# Patient Record
Sex: FEMALE | Race: BLACK OR AFRICAN AMERICAN | Employment: UNEMPLOYED | URBAN - METROPOLITAN AREA
[De-identification: names, ages, dates, MRNs, and addresses within clinical notes are randomized per-mention and may not be internally consistent; named-entity substitution may affect disease eponyms.]

---

## 2017-07-03 ENCOUNTER — APPOINTMENT (OUTPATIENT)
Dept: CT IMAGING | Age: 67
DRG: 078 | End: 2017-07-03
Attending: EMERGENCY MEDICINE
Payer: MEDICARE

## 2017-07-03 ENCOUNTER — APPOINTMENT (OUTPATIENT)
Dept: MRI IMAGING | Age: 67
DRG: 078 | End: 2017-07-03
Attending: INTERNAL MEDICINE
Payer: MEDICARE

## 2017-07-03 ENCOUNTER — HOSPITAL ENCOUNTER (INPATIENT)
Age: 67
LOS: 2 days | Discharge: HOME HEALTH | DRG: 078 | End: 2017-07-05
Attending: EMERGENCY MEDICINE | Admitting: INTERNAL MEDICINE
Payer: MEDICARE

## 2017-07-03 DIAGNOSIS — G45.9 TRANSIENT CEREBRAL ISCHEMIA, UNSPECIFIED TYPE: Primary | ICD-10-CM

## 2017-07-03 LAB
ANION GAP BLD CALC-SCNC: 10 MMOL/L (ref 3–18)
APTT PPP: 31.9 SEC (ref 23–36.4)
ATRIAL RATE: 79 BPM
BASOPHILS # BLD AUTO: 0.1 K/UL (ref 0–0.1)
BASOPHILS # BLD AUTO: 0.1 K/UL (ref 0–0.1)
BASOPHILS # BLD: 1 % (ref 0–2)
BASOPHILS # BLD: 1 % (ref 0–2)
BNP SERPL-MCNC: 400 PG/ML (ref 0–900)
BUN SERPL-MCNC: 12 MG/DL (ref 7–18)
BUN/CREAT SERPL: 11 (ref 12–20)
CALCIUM SERPL-MCNC: 9.1 MG/DL (ref 8.5–10.1)
CALCULATED P AXIS, ECG09: 35 DEGREES
CALCULATED R AXIS, ECG10: -5 DEGREES
CALCULATED T AXIS, ECG11: 6 DEGREES
CHLORIDE SERPL-SCNC: 105 MMOL/L (ref 100–108)
CO2 SERPL-SCNC: 25 MMOL/L (ref 21–32)
CREAT SERPL-MCNC: 1.12 MG/DL (ref 0.6–1.3)
DIAGNOSIS, 93000: NORMAL
DIFFERENTIAL METHOD BLD: ABNORMAL
DIFFERENTIAL METHOD BLD: NORMAL
EOSINOPHIL # BLD: 0.3 K/UL (ref 0–0.4)
EOSINOPHIL # BLD: 0.5 K/UL (ref 0–0.4)
EOSINOPHIL NFR BLD: 5 % (ref 0–5)
EOSINOPHIL NFR BLD: 5 % (ref 0–5)
ERYTHROCYTE [DISTWIDTH] IN BLOOD BY AUTOMATED COUNT: 13.9 % (ref 11.6–14.5)
ERYTHROCYTE [DISTWIDTH] IN BLOOD BY AUTOMATED COUNT: 13.9 % (ref 11.6–14.5)
EST. AVERAGE GLUCOSE BLD GHB EST-MCNC: 163 MG/DL
GLUCOSE BLD STRIP.AUTO-MCNC: 166 MG/DL (ref 70–110)
GLUCOSE BLD STRIP.AUTO-MCNC: 79 MG/DL (ref 70–110)
GLUCOSE BLD STRIP.AUTO-MCNC: 86 MG/DL (ref 70–110)
GLUCOSE SERPL-MCNC: 165 MG/DL (ref 74–99)
HBA1C MFR BLD: 7.3 % (ref 4.2–5.6)
HCT VFR BLD AUTO: 35.4 % (ref 35–45)
HCT VFR BLD AUTO: 38.8 % (ref 35–45)
HGB BLD-MCNC: 12.5 G/DL (ref 12–16)
HGB BLD-MCNC: 13.8 G/DL (ref 12–16)
INR PPP: 0.9 (ref 0.8–1.2)
LACTATE SERPL-SCNC: 1.7 MMOL/L (ref 0.4–2)
LYMPHOCYTES # BLD AUTO: 32 % (ref 21–52)
LYMPHOCYTES # BLD AUTO: 43 % (ref 21–52)
LYMPHOCYTES # BLD: 2.2 K/UL (ref 0.9–3.6)
LYMPHOCYTES # BLD: 4.2 K/UL (ref 0.9–3.6)
MAGNESIUM SERPL-MCNC: 1.8 MG/DL (ref 1.6–2.6)
MCH RBC QN AUTO: 27 PG (ref 24–34)
MCH RBC QN AUTO: 27.1 PG (ref 24–34)
MCHC RBC AUTO-ENTMCNC: 35.3 G/DL (ref 31–37)
MCHC RBC AUTO-ENTMCNC: 35.6 G/DL (ref 31–37)
MCV RBC AUTO: 76.2 FL (ref 74–97)
MCV RBC AUTO: 76.5 FL (ref 74–97)
MONOCYTES # BLD: 0.3 K/UL (ref 0.05–1.2)
MONOCYTES # BLD: 0.7 K/UL (ref 0.05–1.2)
MONOCYTES NFR BLD AUTO: 5 % (ref 3–10)
MONOCYTES NFR BLD AUTO: 8 % (ref 3–10)
NEUTS SEG # BLD: 4 K/UL (ref 1.8–8)
NEUTS SEG # BLD: 4.4 K/UL (ref 1.8–8)
NEUTS SEG NFR BLD AUTO: 43 % (ref 40–73)
NEUTS SEG NFR BLD AUTO: 57 % (ref 40–73)
P-R INTERVAL, ECG05: 200 MS
PLATELET # BLD AUTO: 234 K/UL (ref 135–420)
PLATELET # BLD AUTO: 239 K/UL (ref 135–420)
PMV BLD AUTO: 10.5 FL (ref 9.2–11.8)
PMV BLD AUTO: 10.7 FL (ref 9.2–11.8)
POTASSIUM SERPL-SCNC: 3.7 MMOL/L (ref 3.5–5.5)
PROTHROMBIN TIME: 12.1 SEC (ref 11.5–15.2)
Q-T INTERVAL, ECG07: 420 MS
QRS DURATION, ECG06: 78 MS
QTC CALCULATION (BEZET), ECG08: 481 MS
RBC # BLD AUTO: 4.63 M/UL (ref 4.2–5.3)
RBC # BLD AUTO: 5.09 M/UL (ref 4.2–5.3)
SODIUM SERPL-SCNC: 140 MMOL/L (ref 136–145)
VENTRICULAR RATE, ECG03: 79 BPM
WBC # BLD AUTO: 7 K/UL (ref 4.6–13.2)
WBC # BLD AUTO: 9.9 K/UL (ref 4.6–13.2)

## 2017-07-03 PROCEDURE — 70450 CT HEAD/BRAIN W/O DYE: CPT

## 2017-07-03 PROCEDURE — 83880 ASSAY OF NATRIURETIC PEPTIDE: CPT | Performed by: FAMILY MEDICINE

## 2017-07-03 PROCEDURE — 85025 COMPLETE CBC W/AUTO DIFF WBC: CPT | Performed by: EMERGENCY MEDICINE

## 2017-07-03 PROCEDURE — 65660000000 HC RM CCU STEPDOWN

## 2017-07-03 PROCEDURE — 85730 THROMBOPLASTIN TIME PARTIAL: CPT | Performed by: EMERGENCY MEDICINE

## 2017-07-03 PROCEDURE — 74011250637 HC RX REV CODE- 250/637: Performed by: EMERGENCY MEDICINE

## 2017-07-03 PROCEDURE — 74011250637 HC RX REV CODE- 250/637: Performed by: INTERNAL MEDICINE

## 2017-07-03 PROCEDURE — 93005 ELECTROCARDIOGRAM TRACING: CPT

## 2017-07-03 PROCEDURE — 85610 PROTHROMBIN TIME: CPT | Performed by: EMERGENCY MEDICINE

## 2017-07-03 PROCEDURE — 83036 HEMOGLOBIN GLYCOSYLATED A1C: CPT | Performed by: INTERNAL MEDICINE

## 2017-07-03 PROCEDURE — 99285 EMERGENCY DEPT VISIT HI MDM: CPT

## 2017-07-03 PROCEDURE — 74011000250 HC RX REV CODE- 250: Performed by: EMERGENCY MEDICINE

## 2017-07-03 PROCEDURE — 83735 ASSAY OF MAGNESIUM: CPT | Performed by: FAMILY MEDICINE

## 2017-07-03 PROCEDURE — 36415 COLL VENOUS BLD VENIPUNCTURE: CPT | Performed by: FAMILY MEDICINE

## 2017-07-03 PROCEDURE — 82962 GLUCOSE BLOOD TEST: CPT

## 2017-07-03 PROCEDURE — 83605 ASSAY OF LACTIC ACID: CPT | Performed by: FAMILY MEDICINE

## 2017-07-03 PROCEDURE — 74011250636 HC RX REV CODE- 250/636: Performed by: INTERNAL MEDICINE

## 2017-07-03 PROCEDURE — 70551 MRI BRAIN STEM W/O DYE: CPT

## 2017-07-03 PROCEDURE — 80048 BASIC METABOLIC PNL TOTAL CA: CPT | Performed by: EMERGENCY MEDICINE

## 2017-07-03 PROCEDURE — 93306 TTE W/DOPPLER COMPLETE: CPT

## 2017-07-03 RX ORDER — ATORVASTATIN CALCIUM 40 MG/1
40 TABLET, FILM COATED ORAL DAILY
COMMUNITY

## 2017-07-03 RX ORDER — GLIMEPIRIDE 2 MG/1
2 TABLET ORAL
COMMUNITY

## 2017-07-03 RX ORDER — METOPROLOL TARTRATE 25 MG/1
12.5 TABLET, FILM COATED ORAL 2 TIMES DAILY
Status: DISCONTINUED | OUTPATIENT
Start: 2017-07-03 | End: 2017-07-05 | Stop reason: HOSPADM

## 2017-07-03 RX ORDER — INSULIN LISPRO 100 [IU]/ML
INJECTION, SOLUTION INTRAVENOUS; SUBCUTANEOUS
Status: DISCONTINUED | OUTPATIENT
Start: 2017-07-03 | End: 2017-07-05 | Stop reason: HOSPADM

## 2017-07-03 RX ORDER — ACETAMINOPHEN 325 MG/1
650 TABLET ORAL
Status: DISCONTINUED | OUTPATIENT
Start: 2017-07-03 | End: 2017-07-05 | Stop reason: HOSPADM

## 2017-07-03 RX ORDER — BUSPIRONE HYDROCHLORIDE 15 MG/1
15 TABLET ORAL 2 TIMES DAILY
COMMUNITY
End: 2017-07-05

## 2017-07-03 RX ORDER — CLOPIDOGREL BISULFATE 75 MG/1
75 TABLET ORAL
COMMUNITY

## 2017-07-03 RX ORDER — SODIUM CHLORIDE 9 MG/ML
10 INJECTION INTRAMUSCULAR; INTRAVENOUS; SUBCUTANEOUS
Status: COMPLETED | OUTPATIENT
Start: 2017-07-03 | End: 2017-07-03

## 2017-07-03 RX ORDER — HYDROCODONE BITARTRATE AND ACETAMINOPHEN 5; 325 MG/1; MG/1
1 TABLET ORAL
Status: DISCONTINUED | OUTPATIENT
Start: 2017-07-03 | End: 2017-07-05 | Stop reason: HOSPADM

## 2017-07-03 RX ORDER — FAMOTIDINE 40 MG/1
40 TABLET, FILM COATED ORAL DAILY
Status: ON HOLD | COMMUNITY
End: 2017-07-05

## 2017-07-03 RX ORDER — NAPROXEN 375 MG/1
375 TABLET ORAL 2 TIMES DAILY WITH MEALS
COMMUNITY
End: 2017-07-05

## 2017-07-03 RX ORDER — METFORMIN HYDROCHLORIDE 1000 MG/1
1000 TABLET ORAL 2 TIMES DAILY WITH MEALS
COMMUNITY

## 2017-07-03 RX ORDER — LORAZEPAM 2 MG/ML
1 INJECTION INTRAMUSCULAR ONCE
Status: COMPLETED | OUTPATIENT
Start: 2017-07-03 | End: 2017-07-03

## 2017-07-03 RX ORDER — SODIUM CHLORIDE 9 MG/ML
50 INJECTION, SOLUTION INTRAVENOUS CONTINUOUS
Status: DISCONTINUED | OUTPATIENT
Start: 2017-07-03 | End: 2017-07-04

## 2017-07-03 RX ORDER — IRON POLYSACCHARIDE COMPLEX 150 MG
150 CAPSULE ORAL DAILY
COMMUNITY

## 2017-07-03 RX ORDER — MAGNESIUM SULFATE 100 %
4 CRYSTALS MISCELLANEOUS AS NEEDED
Status: DISCONTINUED | OUTPATIENT
Start: 2017-07-03 | End: 2017-07-05 | Stop reason: HOSPADM

## 2017-07-03 RX ORDER — LISINOPRIL 10 MG/1
10 TABLET ORAL DAILY
COMMUNITY

## 2017-07-03 RX ORDER — AMLODIPINE BESYLATE 10 MG/1
10 TABLET ORAL DAILY
Status: DISCONTINUED | OUTPATIENT
Start: 2017-07-04 | End: 2017-07-05 | Stop reason: HOSPADM

## 2017-07-03 RX ORDER — DEXTROSE 50 % IN WATER (D50W) INTRAVENOUS SYRINGE
25-50 AS NEEDED
Status: DISCONTINUED | OUTPATIENT
Start: 2017-07-03 | End: 2017-07-05 | Stop reason: HOSPADM

## 2017-07-03 RX ORDER — ACETAMINOPHEN 500 MG
1000 TABLET ORAL
Status: COMPLETED | OUTPATIENT
Start: 2017-07-03 | End: 2017-07-03

## 2017-07-03 RX ORDER — HYDRALAZINE HYDROCHLORIDE 20 MG/ML
10 INJECTION INTRAMUSCULAR; INTRAVENOUS
Status: DISCONTINUED | OUTPATIENT
Start: 2017-07-03 | End: 2017-07-05 | Stop reason: HOSPADM

## 2017-07-03 RX ORDER — LISINOPRIL 10 MG/1
10 TABLET ORAL DAILY
Status: DISCONTINUED | OUTPATIENT
Start: 2017-07-04 | End: 2017-07-05 | Stop reason: HOSPADM

## 2017-07-03 RX ORDER — ONDANSETRON 2 MG/ML
4 INJECTION INTRAMUSCULAR; INTRAVENOUS
Status: DISCONTINUED | OUTPATIENT
Start: 2017-07-03 | End: 2017-07-05 | Stop reason: HOSPADM

## 2017-07-03 RX ORDER — HEPARIN SODIUM 5000 [USP'U]/ML
5000 INJECTION, SOLUTION INTRAVENOUS; SUBCUTANEOUS EVERY 8 HOURS
Status: DISCONTINUED | OUTPATIENT
Start: 2017-07-03 | End: 2017-07-05 | Stop reason: HOSPADM

## 2017-07-03 RX ORDER — MONTELUKAST SODIUM 10 MG/1
10 TABLET ORAL DAILY
COMMUNITY

## 2017-07-03 RX ORDER — FLUTICASONE FUROATE AND VILANTEROL 200; 25 UG/1; UG/1
1 POWDER RESPIRATORY (INHALATION) DAILY
COMMUNITY

## 2017-07-03 RX ADMIN — METOPROLOL TARTRATE 12.5 MG: 25 TABLET ORAL at 19:15

## 2017-07-03 RX ADMIN — LORAZEPAM 1 MG: 2 INJECTION INTRAMUSCULAR; INTRAVENOUS at 21:22

## 2017-07-03 RX ADMIN — ACETAMINOPHEN 1000 MG: 500 TABLET ORAL at 14:05

## 2017-07-03 RX ADMIN — SODIUM CHLORIDE 50 ML/HR: 900 INJECTION, SOLUTION INTRAVENOUS at 19:16

## 2017-07-03 RX ADMIN — SODIUM CHLORIDE 10 ML: 9 INJECTION INTRAMUSCULAR; INTRAVENOUS; SUBCUTANEOUS at 15:43

## 2017-07-03 RX ADMIN — HYDRALAZINE HYDROCHLORIDE 10 MG: 20 INJECTION INTRAMUSCULAR; INTRAVENOUS at 22:41

## 2017-07-03 RX ADMIN — HEPARIN SODIUM 5000 UNITS: 5000 INJECTION, SOLUTION INTRAVENOUS; SUBCUTANEOUS at 23:31

## 2017-07-03 RX ADMIN — HEPARIN SODIUM 5000 UNITS: 5000 INJECTION, SOLUTION INTRAVENOUS; SUBCUTANEOUS at 14:05

## 2017-07-03 RX ADMIN — HYDROCODONE BITARTRATE AND ACETAMINOPHEN 1 TABLET: 5; 325 TABLET ORAL at 17:36

## 2017-07-03 NOTE — ED TRIAGE NOTES
Per medic: \"Around 0900 this morning patient started complaining of severe headache, left sided weakness, and slurred speech. Upon arrival to the ER patient symptoms were resolved. No slurred speech, or  Weakness however continues to complain of a headache.

## 2017-07-03 NOTE — ROUTINE PROCESS
Bedside and Verbal shift change report given to [address] [place] [address] (oncoming nurse) by Qasim Corona (offgoing nurse). Report included the following information SBAR, Kardex and MAR.

## 2017-07-03 NOTE — PROGRESS NOTES
Pt with active Continuous Bedrest order. Please discontinue and update activity order to permit participation in therapy evaluation.   Abdulaziz Martinez, PTA

## 2017-07-03 NOTE — ED NOTES
Placed pt in gown with bed in low/locked position with both side rails up and call bell within reach. Pt placed on BP, cardiac and O2 monitoring. Pt provided with warm blankets.

## 2017-07-03 NOTE — IP AVS SNAPSHOT
[address] Gaudencio Patient: Berkley Li MRN: [id number] :1950 You are allergic to the following No active allergies Recent Documentation Height Weight Breastfeeding? BMI Smoking Status 1.676 m 89.1 kg No 31.72 kg/m2 Never Smoker Emergency Contacts Name Discharge Info Relation Home Work Mobile Pollo Mccracken  Spouse [3] [phone] Naveed Mccracken  Daughter [21]   [phone] About your hospitalization You were admitted on:  July 3, 2017 You last received care in the:  [facility] [address] You were discharged on:  2017 Unit phone number:  [phone] Why you were hospitalized Your primary diagnosis was:  Not on File Your diagnoses also included:  Tia (Transient Ischemic Attack) Providers Seen During Your Hospitalizations Provider Role Specialty Primary office phone Salo Degroot MD Attending Provider Emergency Medicine [phone] Jeffery Morse MD Attending Provider Internal Medicine [phone] Your Primary Care Physician (PCP) Primary Care Physician Office Phone Office Fax NANDA ARCOS ** None ** ** None ** Follow-up Information Follow up With Details Comments Contact Info Mana Delvalle on 7/10/2017 follow up @4:00pm [address] [address] 
[phone] Current Discharge Medication List  
  
START taking these medications Dose & Instructions Dispensing Information Comments Morning Noon Evening Bedtime  
 amLODIPine 10 mg tablet Commonly known as:  Natividad Wing Your last dose was: Your next dose is:    
   
   
 Dose:  10 mg Take 1 Tab by mouth daily. Quantity:  30 Tab Refills:  0  
     
   
   
   
  
 metoprolol tartrate 25 mg tablet Commonly known as:  LOPRESSOR  
 Your last dose was: Your next dose is:    
   
   
 Dose:  12.5 mg Take 0.5 Tabs by mouth two (2) times a day. Quantity:  30 Tab Refills:  0  
     
   
   
   
  
 OTHER Your last dose was: Your next dose is:    
   
   
 Check CBC, CMP, Mg in 3 days, results to PCP immediately. Diagnosis- HTN Quantity:  1 Each Refills:  0 CONTINUE these medications which have CHANGED Dose & Instructions Dispensing Information Comments Morning Noon Evening Bedtime  
 famotidine 20 mg tablet Commonly known as:  PEPCID What changed:   
- medication strength 
- how much to take Your last dose was: Your next dose is:    
   
   
 Dose:  20 mg Take 1 Tab by mouth daily. Quantity:  30 Tab Refills:  0 CONTINUE these medications which have NOT CHANGED Dose & Instructions Dispensing Information Comments Morning Noon Evening Bedtime  
 atorvastatin 40 mg tablet Commonly known as:  LIPITOR Your last dose was: Your next dose is:    
   
   
 Dose:  40 mg Take 40 mg by mouth daily. Refills:  0  
     
   
   
   
  
 BREO ELLIPTA 200-25 mcg/dose inhaler Generic drug:  fluticasone-vilanterol Your last dose was: Your next dose is:    
   
   
 Dose:  1 Puff Take 1 Puff by inhalation daily. Refills:  0  
     
   
   
   
  
 clopidogrel 75 mg Tab Commonly known as:  PLAVIX Your last dose was: Your next dose is:    
   
   
 Dose:  75 mg Take 75 mg by mouth. Refills:  0  
     
   
   
   
  
 fluticasone 27.5 mcg/actuation nasal spray Commonly known as:  VERAMYST Your last dose was: Your next dose is:    
   
   
 Dose:  2 Spray 2 Sprays by Nasal route daily. Refills:  0  
     
   
   
   
  
 glimepiride 2 mg tablet Commonly known as:  AMARYL Your last dose was: Your next dose is:    
   
   
 Dose:  2 mg Take 2 mg by mouth every morning. Refills:  0  
     
   
   
   
  
 iron polysaccharides 150 mg iron capsule Commonly known as:  Surjit Garduno Your last dose was: Your next dose is:    
   
   
 Dose:  150 mg Take 150 mg by mouth daily. Refills:  0  
     
   
   
   
  
 lisinopril 10 mg tablet Commonly known as:  Mccracken Tyler Your last dose was: Your next dose is:    
   
   
 Dose:  10 mg Take 10 mg by mouth daily. Refills:  0  
     
   
   
   
  
 metFORMIN 1,000 mg tablet Commonly known as:  GLUCOPHAGE Your last dose was: Your next dose is:    
   
   
 Dose:  1000 mg Take 1,000 mg by mouth two (2) times daily (with meals). Refills:  0  
     
   
   
   
  
 montelukast 10 mg tablet Commonly known as:  SINGULAIR Your last dose was: Your next dose is:    
   
   
 Dose:  10 mg Take 10 mg by mouth daily. Refills:  0 STOP taking these medications   
 busPIRone 15 mg tablet Commonly known as:  BUSPAR  
   
  
 naproxen 375 mg tablet Commonly known as:  NAPROSYN Where to Get Your Medications Information on where to get these meds will be given to you by the nurse or doctor. ! Ask your nurse or doctor about these medications  
  amLODIPine 10 mg tablet  
 famotidine 20 mg tablet  
 metoprolol tartrate 25 mg tablet OTHER Discharge Instructions Transient Ischemic Attack: Care Instructions Your Care Instructions A transient ischemic attack (TIA) is when blood flow to a part of your brain is blocked for a short time. A TIA is like a stroke but usually lasts only a few minutes. A TIA does not cause lasting brain damage. Any vision problems, slurred speech, or other symptoms usually go away in 10 to 20 minutes. But they may last for up to 24 hours. TIAs are often warning signs of a stroke.  Some people who have a TIA may have a stroke in the future. A stroke can cause symptoms like those of a TIA. But a stroke causes lasting damage to your brain. You can take steps to help prevent a stroke. One thing you can do is get early treatment. If you have other new symptoms, or if your symptoms do not get better, go back to the emergency room or call your doctor right away. Getting treatment right away may prevent long-term brain damage caused by a stroke. The doctor has checked you carefully, but problems can develop later. If you notice any problems or new symptoms, get medical treatment right away. Follow-up care is a key part of your treatment and safety. Be sure to make and go to all appointments, and call your doctor if you are having problems. It's also a good idea to know your test results and keep a list of the medicines you take. How can you care for yourself at home? Medicines · Be safe with medicines. Take your medicines exactly as prescribed. Call your doctor if you think you are having a problem with your medicine. · If you take a blood thinner, such as aspirin, be sure you get instructions about how to take your medicine safely. Blood thinners can cause serious bleeding problems. · Call your doctor if you are not able to take your medicines for any reason. · Do not take any over-the-counter medicines or herbal products without talking to your doctor first. 
· If you take birth control pills or hormone therapy, talk to your doctor. Ask if these treatments are right for you. Lifestyle changes · Do not smoke. If you need help quitting, talk to your doctor about stop-smoking programs and medicines. · Be active. If your doctor recommends it, get more exercise. Walking is a good choice. Bit by bit, increase the amount you walk every day. Try for at least 30 minutes on most days of the week. You also may want to swim, bike, or do other activities. · Eat heart-healthy foods.  These include fruits, vegetables, high-fiber foods, fish, and foods that are low in sodium, saturated fat, and trans fat. · Stay at a healthy weight. Lose weight if you need to. · Limit alcohol to 2 drinks a day for men and 1 drink a day for women. Staying healthy · Manage other health problems such as diabetes, high blood pressure, and high cholesterol. · Get the flu vaccine every year. When should you call for help? Call 911 anytime you think you may need emergency care. For example, call if: 
· You have new or worse symptoms of a stroke. These may include: 
¨ Sudden numbness, tingling, weakness, or loss of movement in your face, arm, or leg, especially on only one side of your body. ¨ Sudden vision changes. ¨ Sudden trouble speaking. ¨ Sudden confusion or trouble understanding simple statements. ¨ Sudden problems with walking or balance. ¨ A sudden, severe headache that is different from past headaches. Call 911 even if these symptoms go away in a few minutes. · You feel like you are having another TIA. Watch closely for changes in your health, and be sure to contact your doctor if you have any problems. Where can you learn more? Go to http://[facility].info/. Enter [phone] in the search box to learn more about \"Transient Ischemic Attack: Care Instructions. \" Current as of: March 20, 2017 Content Version: 11.3 © 4777-1963 [employer]. Care instructions adapted under license by [employer] (which disclaims liability or warranty for this information). If you have questions about a medical condition or this instruction, always ask your healthcare professional. [place] [address] any warranty or liability for your use of this information. DISCHARGE SUMMARY from Nurse The following personal items are in your possession at time of discharge: 
 
Dental Appliances: None Visual Aid: Glasses, At bedside Home Medications: Sent home Jewelry: None Clothing: Undergarments, Pants, Slippers Other Valuables: None PATIENT INSTRUCTIONS: 
 
 
F-face looks uneven A-arms unable to move or move unevenly S-speech slurred or non-existent T-time-call 911 as soon as signs and symptoms begin-DO NOT go Back to bed or wait to see if you get better-TIME IS BRAIN. Warning Signs of HEART ATTACK Call 911 if you have these symptoms: 
? Chest discomfort. Most heart attacks involve discomfort in the center of the chest that lasts more than a few minutes, or that goes away and comes back. It can feel like uncomfortable pressure, squeezing, fullness, or pain. ? Discomfort in other areas of the upper body. Symptoms can include pain or discomfort in one or both arms, the back, neck, jaw, or stomach. ? Shortness of breath with or without chest discomfort. ? Other signs may include breaking out in a cold sweat, nausea, or lightheadedness. Don't wait more than five minutes to call [address]! Fast action can save your life. Calling 911 is almost always the fastest way to get lifesaving treatment. Emergency Medical Services staff can begin treatment when they arrive  up to an hour sooner than if someone gets to the hospital by car. The discharge information has been reviewed with the patient and spouse. The patient and spouse verbalized understanding. Discharge medications reviewed with the patient and spouse and appropriate educational materials and side effects teaching were provided. Discharge Orders None Introducing [facility]! Yudi Garcia introduces [employer] patient portal. Now you can access parts of your medical record, email your doctor's office, and request medication refills online. 1. In your internet browser, go to [email or web address] 2. Click on the First Time User? Click Here link in the Sign In box. You will see the New Member Sign Up page. 3. Enter your [employer] Access Code exactly as it appears below. You will not need to use this code after youve completed the sign-up process. If you do not sign up before the expiration date, you must request a new code. · [employer] Access Code: [email or web address] Expires: 10/3/2017  2:30 PM 
 
4. Enter the last four digits of your Social Security Number (xxxx) and Date of Birth (mm/dd/yyyy) as indicated and click Submit. You will be taken to the next sign-up page. 5. Create a [employer]maker ID. This will be your [employer]maker login ID and cannot be changed, so think of one that is secure and easy to remember. 6. Create a [employer]maker password. You can change your password at any time. 7. Enter your Password Reset Question and Answer. This can be used at a later time if you forget your password. 8. Enter your e-mail address. You will receive e-mail notification when new information is available in [address] 9. Click Sign Up. You can now view and download portions of your medical record. 10. Click the Download Summary menu link to download a portable copy of your medical information. If you have questions, please visit the Frequently Asked Questions section of the [employer] website. Remember, [employer] is NOT to be used for urgent needs. For medical emergencies, dial 911. Now available from your iPhone and Android! General Information Please provide this summary of care documentation to your next provider. Patient Signature:  ____________________________________________________________ Date:  ____________________________________________________________  
  
Elizabethann Glad Provider Signature:  ____________________________________________________________ Date:  ____________________________________________________________

## 2017-07-03 NOTE — PROGRESS NOTES
Completed Echocardiogram. Report to follow. Patient to be transported back to room.     SHANITA Bonilla, RDCS

## 2017-07-03 NOTE — IP AVS SNAPSHOT
Current Discharge Medication List  
  
START taking these medications Dose & Instructions Dispensing Information Comments Morning Noon Evening Bedtime  
 amLODIPine 10 mg tablet Commonly known as:  Alex Egan Your last dose was: Your next dose is:    
   
   
 Dose:  10 mg Take 1 Tab by mouth daily. Quantity:  30 Tab Refills:  0  
     
   
   
   
  
 metoprolol tartrate 25 mg tablet Commonly known as:  LOPRESSOR Your last dose was: Your next dose is:    
   
   
 Dose:  12.5 mg Take 0.5 Tabs by mouth two (2) times a day. Quantity:  30 Tab Refills:  0  
     
   
   
   
  
 OTHER Your last dose was: Your next dose is:    
   
   
 Check CBC, CMP, Mg in 3 days, results to PCP immediately. Diagnosis- HTN Quantity:  1 Each Refills:  0 CONTINUE these medications which have CHANGED Dose & Instructions Dispensing Information Comments Morning Noon Evening Bedtime  
 famotidine 20 mg tablet Commonly known as:  PEPCID What changed:   
- medication strength 
- how much to take Your last dose was: Your next dose is:    
   
   
 Dose:  20 mg Take 1 Tab by mouth daily. Quantity:  30 Tab Refills:  0 CONTINUE these medications which have NOT CHANGED Dose & Instructions Dispensing Information Comments Morning Noon Evening Bedtime  
 atorvastatin 40 mg tablet Commonly known as:  LIPITOR Your last dose was: Your next dose is:    
   
   
 Dose:  40 mg Take 40 mg by mouth daily. Refills:  0  
     
   
   
   
  
 BREO ELLIPTA 200-25 mcg/dose inhaler Generic drug:  fluticasone-vilanterol Your last dose was: Your next dose is:    
   
   
 Dose:  1 Puff Take 1 Puff by inhalation daily. Refills:  0  
     
   
   
   
  
 clopidogrel 75 mg Tab Commonly known as:  PLAVIX Your last dose was: Your next dose is:    
   
   
 Dose:  75 mg Take 75 mg by mouth. Refills:  0  
     
   
   
   
  
 fluticasone 27.5 mcg/actuation nasal spray Commonly known as:  VERAMYST Your last dose was: Your next dose is:    
   
   
 Dose:  2 Spray 2 Sprays by Nasal route daily. Refills:  0  
     
   
   
   
  
 glimepiride 2 mg tablet Commonly known as:  AMARYL Your last dose was: Your next dose is:    
   
   
 Dose:  2 mg Take 2 mg by mouth every morning. Refills:  0  
     
   
   
   
  
 iron polysaccharides 150 mg iron capsule Commonly known as:  Iliana Starcher Your last dose was: Your next dose is:    
   
   
 Dose:  150 mg Take 150 mg by mouth daily. Refills:  0  
     
   
   
   
  
 lisinopril 10 mg tablet Commonly known as:  Taya Tiger Your last dose was: Your next dose is:    
   
   
 Dose:  10 mg Take 10 mg by mouth daily. Refills:  0  
     
   
   
   
  
 metFORMIN 1,000 mg tablet Commonly known as:  GLUCOPHAGE Your last dose was: Your next dose is:    
   
   
 Dose:  1000 mg Take 1,000 mg by mouth two (2) times daily (with meals). Refills:  0  
     
   
   
   
  
 montelukast 10 mg tablet Commonly known as:  SINGULAIR Your last dose was: Your next dose is:    
   
   
 Dose:  10 mg Take 10 mg by mouth daily. Refills:  0 STOP taking these medications   
 busPIRone 15 mg tablet Commonly known as:  BUSPAR  
   
  
 naproxen 375 mg tablet Commonly known as:  NAPROSYN Where to Get Your Medications Information on where to get these meds will be given to you by the nurse or doctor. ! Ask your nurse or doctor about these medications  
  amLODIPine 10 mg tablet  
 famotidine 20 mg tablet  
 metoprolol tartrate 25 mg tablet  OTHER

## 2017-07-03 NOTE — ED PROVIDER NOTES
HPI Comments: Patient brought in by EMS with left-sided facial droop, started at 9 PM today, completely resolved EMS reports there NIH of 0, they do not appreciate any focal neurologic deficit on their evaluation. She was at her normal state of health prior to evaluation, no chest pain or abdominal pain. No history of stroke. No other complaints. Patient is a 78 y.o. female presenting with headaches and weakness. Headache   Associated symptoms include headaches. Pertinent negatives include no chest pain and no shortness of breath. Extremity Weakness   Associated symptoms include headaches. Pertinent negatives include no shortness of breath and no chest pain. Past Medical History:   Diagnosis Date    Diabetes ([facility] [address]     Hypertension     Stroke [facility])        History reviewed. No pertinent surgical history. History reviewed. No pertinent family history. Social History     Social History    Marital status:      Spouse name: N/A    Number of children: N/A    Years of education: N/A     Occupational History    Not on file. Social History Main Topics    Smoking status: Never Smoker    Smokeless tobacco: Never Used    Alcohol use No    Drug use: No    Sexual activity: Not on file     Other Topics Concern    Not on file     Social History Narrative    No narrative on file         ALLERGIES: Review of patient's allergies indicates no known allergies. Review of Systems   Constitutional: Negative for fever. HENT: Negative for nosebleeds. Eyes: Negative for visual disturbance. Respiratory: Negative for shortness of breath. Cardiovascular: Negative for chest pain. Gastrointestinal: Negative for blood in stool. Genitourinary: Negative for dysuria. Musculoskeletal: Negative for myalgias. Skin: Negative for rash. Neurological: Positive for weakness and headaches. All other systems reviewed and are negative.       Vitals:    07/03/17 1136   BP: (!) 194/104   Pulse: 93   Resp: 16   Temp: 98 °F (36.7 °C)   SpO2: 99%   Weight: 90.7 kg (200 lb)            Physical Exam   Constitutional:   General:  Well-developed, well-nourished, no apparent distress. Head:  Normocephalic atraumatic. Eyes:  Pupils midrange extraocular movements intact. No pallor or conjunctival injection. Nose:  No rhinorrhea, inspection grossly normal.    Ears:  Grossly normal to inspection, no discharge. Mouth:  Mucous membranes moist, no appreciable intraoral lesion. Neck/Back:  Trachea midline, no asymmetry. Chest:  Grossly normal inspection, symmetric chest rise. Pulmonary:  Clear to auscultation bilaterally no wheezes rhonchi or rales. Cardiovascular:  S1-S2 no murmurs rubs or gallops. Abdomen: Soft, nontender, nondistended no guarding rebound or peritoneal signs. Extremities:  Grossly normal to inspection, peripheral pulses intact    Neurologic:  Alert and oriented  Able to answer name and date correctly  Performs eye closing and hand squeeze tasks  No dysarthria or aphasia  Symmetric smile, uvula midline, no blunting of nasolabial fold  Facial sensation grossly intact to light touch  UPPER extremity:  Bilateral arm raised off bed and held against gravity for 5 seconds   strong and equal  No past pointing  Sensation grossly intact to light touch  LOWER extremity:  Bilateral leg raises off bed and held against gravity for 5 seconds  Sensation grossly intact to light touch. Skin:  Warm and dry  Psychiatric:  Grossly normal mood and affect. Nursing note reviewed, vital signs reviewed. MDM  Number of Diagnoses or Management Options  Transient cerebral ischemia, unspecified type:   Diagnosis management comments: ED course:  Patient presents with LEFT sided facial droop, no appreciable deficit on my evaluation NIH equals 0, will still consult tele-neurology sent directly to CT scan for risk stratification      Consult:  Discussed care with Dr. Francis Allen.  Standard discussion; including history of patient's chief complaint, available diagnostic results, and treatment course. CT head per radiology microvascular changes     Patient will be admitted for further management labs are unremarkable        Patient's presentation, history, physical exam and laboratory evaluations were reviewed. I felt the patient would benefit from inpatient management and treatment. Consult:  Discussed care with Dr Pranav Eagle . Carla Biggs Standard discussion; including history of patient's chief complaint, available diagnostic results, and treatment course. Patient was accepted to their service. Disposition:     Admitted to medicine service        Portions of this chart were created with Dragon medical speech to text program.   Unrecognized errors may be present.       ED Course       Procedures

## 2017-07-03 NOTE — Clinical Note
Status[de-identified] Inpatient [101] Type of Bed: Telemetry [19] Inpatient Hospitalization Certified Necessary for the Following Reasons: 9. Other (further clarification in H&P documentation) Admitting Diagnosis: TIA (transient ischemic attack) [593463] Admitting Physician: Bunny Abrams Attending Physician: Bunny Abrams Estimated Length of Stay: 2 Midnights Discharge Plan[de-identified] Home with Office Follow-up

## 2017-07-03 NOTE — ROUTINE PROCESS
TRANSFER - IN REPORT:    Verbal report received from Leonardo WHIPPLE RN(name) on Baby Poke  being received from ED(unit) for routine progression of care      Report consisted of patients Situation, Background, Assessment and   Recommendations(SBAR). Information from the following report(s) SBAR, Kardex, ED Summary and Recent Results was reviewed with the receiving nurse. Opportunity for questions and clarification was provided. Assessment completed upon patients arrival to unit and care assumed.

## 2017-07-04 LAB
ANION GAP BLD CALC-SCNC: 9 MMOL/L (ref 3–18)
ATRIAL RATE: 69 BPM
BASOPHILS # BLD AUTO: 0 K/UL (ref 0–0.1)
BASOPHILS # BLD: 0 % (ref 0–2)
BUN SERPL-MCNC: 11 MG/DL (ref 7–18)
BUN/CREAT SERPL: 11 (ref 12–20)
CALCIUM SERPL-MCNC: 8.7 MG/DL (ref 8.5–10.1)
CALCULATED P AXIS, ECG09: 57 DEGREES
CALCULATED R AXIS, ECG10: 9 DEGREES
CALCULATED T AXIS, ECG11: 13 DEGREES
CHLORIDE SERPL-SCNC: 105 MMOL/L (ref 100–108)
CHOLEST SERPL-MCNC: 106 MG/DL
CO2 SERPL-SCNC: 27 MMOL/L (ref 21–32)
CREAT SERPL-MCNC: 0.96 MG/DL (ref 0.6–1.3)
DIAGNOSIS, 93000: NORMAL
DIFFERENTIAL METHOD BLD: ABNORMAL
EOSINOPHIL # BLD: 0.5 K/UL (ref 0–0.4)
EOSINOPHIL NFR BLD: 6 % (ref 0–5)
ERYTHROCYTE [DISTWIDTH] IN BLOOD BY AUTOMATED COUNT: 14 % (ref 11.6–14.5)
GLUCOSE BLD STRIP.AUTO-MCNC: 115 MG/DL (ref 70–110)
GLUCOSE BLD STRIP.AUTO-MCNC: 123 MG/DL (ref 70–110)
GLUCOSE BLD STRIP.AUTO-MCNC: 127 MG/DL (ref 70–110)
GLUCOSE BLD STRIP.AUTO-MCNC: 99 MG/DL (ref 70–110)
GLUCOSE SERPL-MCNC: 94 MG/DL (ref 74–99)
HBA1C MFR BLD: 7.6 % (ref 4.2–5.6)
HCT VFR BLD AUTO: 35.6 % (ref 35–45)
HDLC SERPL-MCNC: 48 MG/DL (ref 40–60)
HDLC SERPL: 2.2 {RATIO} (ref 0–5)
HGB BLD-MCNC: 12.6 G/DL (ref 12–16)
LDLC SERPL CALC-MCNC: 39.6 MG/DL (ref 0–100)
LIPID PROFILE,FLP: NORMAL
LYMPHOCYTES # BLD AUTO: 45 % (ref 21–52)
LYMPHOCYTES # BLD: 3.5 K/UL (ref 0.9–3.6)
MCH RBC QN AUTO: 27 PG (ref 24–34)
MCHC RBC AUTO-ENTMCNC: 35.4 G/DL (ref 31–37)
MCV RBC AUTO: 76.2 FL (ref 74–97)
MONOCYTES # BLD: 0.6 K/UL (ref 0.05–1.2)
MONOCYTES NFR BLD AUTO: 8 % (ref 3–10)
NEUTS SEG # BLD: 3.1 K/UL (ref 1.8–8)
NEUTS SEG NFR BLD AUTO: 41 % (ref 40–73)
P-R INTERVAL, ECG05: 218 MS
PLATELET # BLD AUTO: 238 K/UL (ref 135–420)
PMV BLD AUTO: 10.8 FL (ref 9.2–11.8)
POTASSIUM SERPL-SCNC: 3.7 MMOL/L (ref 3.5–5.5)
Q-T INTERVAL, ECG07: 452 MS
QRS DURATION, ECG06: 86 MS
QTC CALCULATION (BEZET), ECG08: 484 MS
RBC # BLD AUTO: 4.67 M/UL (ref 4.2–5.3)
SODIUM SERPL-SCNC: 141 MMOL/L (ref 136–145)
TRIGL SERPL-MCNC: 92 MG/DL (ref ?–150)
VENTRICULAR RATE, ECG03: 69 BPM
VLDLC SERPL CALC-MCNC: 18.4 MG/DL
WBC # BLD AUTO: 7.7 K/UL (ref 4.6–13.2)

## 2017-07-04 PROCEDURE — 97161 PT EVAL LOW COMPLEX 20 MIN: CPT

## 2017-07-04 PROCEDURE — 74011636637 HC RX REV CODE- 636/637: Performed by: EMERGENCY MEDICINE

## 2017-07-04 PROCEDURE — 74011000250 HC RX REV CODE- 250

## 2017-07-04 PROCEDURE — 82962 GLUCOSE BLOOD TEST: CPT

## 2017-07-04 PROCEDURE — 65660000000 HC RM CCU STEPDOWN

## 2017-07-04 PROCEDURE — 74011250636 HC RX REV CODE- 250/636: Performed by: INTERNAL MEDICINE

## 2017-07-04 PROCEDURE — 97165 OT EVAL LOW COMPLEX 30 MIN: CPT

## 2017-07-04 PROCEDURE — 74011250637 HC RX REV CODE- 250/637: Performed by: EMERGENCY MEDICINE

## 2017-07-04 PROCEDURE — 80061 LIPID PANEL: CPT | Performed by: INTERNAL MEDICINE

## 2017-07-04 PROCEDURE — 85025 COMPLETE CBC W/AUTO DIFF WBC: CPT | Performed by: INTERNAL MEDICINE

## 2017-07-04 PROCEDURE — 77030027138 HC INCENT SPIROMETER -A

## 2017-07-04 PROCEDURE — 83036 HEMOGLOBIN GLYCOSYLATED A1C: CPT | Performed by: PSYCHIATRY & NEUROLOGY

## 2017-07-04 PROCEDURE — 36415 COLL VENOUS BLD VENIPUNCTURE: CPT | Performed by: INTERNAL MEDICINE

## 2017-07-04 PROCEDURE — 74011250637 HC RX REV CODE- 250/637: Performed by: INTERNAL MEDICINE

## 2017-07-04 PROCEDURE — 80048 BASIC METABOLIC PNL TOTAL CA: CPT | Performed by: INTERNAL MEDICINE

## 2017-07-04 RX ORDER — LABETALOL HCL 20 MG/4 ML
10 SYRINGE (ML) INTRAVENOUS
Status: DISCONTINUED | OUTPATIENT
Start: 2017-07-04 | End: 2017-07-05 | Stop reason: HOSPADM

## 2017-07-04 RX ORDER — LABETALOL HYDROCHLORIDE 5 MG/ML
INJECTION, SOLUTION INTRAVENOUS
Status: COMPLETED
Start: 2017-07-04 | End: 2017-07-04

## 2017-07-04 RX ORDER — CLOPIDOGREL BISULFATE 75 MG/1
75 TABLET ORAL DAILY
Status: DISCONTINUED | OUTPATIENT
Start: 2017-07-04 | End: 2017-07-05 | Stop reason: HOSPADM

## 2017-07-04 RX ORDER — ATORVASTATIN CALCIUM 40 MG/1
40 TABLET, FILM COATED ORAL
Status: DISCONTINUED | OUTPATIENT
Start: 2017-07-04 | End: 2017-07-05 | Stop reason: HOSPADM

## 2017-07-04 RX ADMIN — ATORVASTATIN CALCIUM 40 MG: 40 TABLET, FILM COATED ORAL at 22:01

## 2017-07-04 RX ADMIN — METOPROLOL TARTRATE 12.5 MG: 25 TABLET ORAL at 18:16

## 2017-07-04 RX ADMIN — HEPARIN SODIUM 5000 UNITS: 5000 INJECTION, SOLUTION INTRAVENOUS; SUBCUTANEOUS at 13:06

## 2017-07-04 RX ADMIN — INSULIN DETEMIR 12 UNITS: 100 INJECTION, SOLUTION SUBCUTANEOUS at 18:25

## 2017-07-04 RX ADMIN — Medication 10 MG: at 18:14

## 2017-07-04 RX ADMIN — HEPARIN SODIUM 5000 UNITS: 5000 INJECTION, SOLUTION INTRAVENOUS; SUBCUTANEOUS at 06:00

## 2017-07-04 RX ADMIN — LABETALOL HYDROCHLORIDE 10 MG: 5 INJECTION, SOLUTION INTRAVENOUS at 18:14

## 2017-07-04 RX ADMIN — CLOPIDOGREL BISULFATE 75 MG: 75 TABLET ORAL at 14:48

## 2017-07-04 RX ADMIN — ACETAMINOPHEN 650 MG: 325 TABLET ORAL at 21:55

## 2017-07-04 RX ADMIN — HYDROCODONE BITARTRATE AND ACETAMINOPHEN 1 TABLET: 5; 325 TABLET ORAL at 16:17

## 2017-07-04 RX ADMIN — LISINOPRIL 10 MG: 10 TABLET ORAL at 08:34

## 2017-07-04 RX ADMIN — HEPARIN SODIUM 5000 UNITS: 5000 INJECTION, SOLUTION INTRAVENOUS; SUBCUTANEOUS at 21:55

## 2017-07-04 RX ADMIN — AMLODIPINE BESYLATE 10 MG: 10 TABLET ORAL at 08:34

## 2017-07-04 RX ADMIN — METOPROLOL TARTRATE 12.5 MG: 25 TABLET ORAL at 08:35

## 2017-07-04 NOTE — CONSULTS
Full note dictated  Call for questions  TIA vs hypertensive urgency/crisis  Endorses 2 weeks htn,headache and chest pain  On maximal med therapy  Need to bring down BP. Stop elavil. [place] Dials Teo Dials May be contributing.   Also has prolonged QT

## 2017-07-04 NOTE — CONSULTS
[address]    Name:  Rylee Eubanks  MR#:  [id number]  :  1950  Account #:  [de-identified]  Date of Adm:  2017  Date of Consultation:  2017      REASON FOR CONSULTATION REQUEST: TIA. HISTORY OF PRESENT ILLNESS: This pleasant 60-year-old woman  with a history of type 2 diabetes; hypertension; positive family history of  stroke, multiple at a young age with her mother; dyslipidemia, on statin  therapy, and  ancestry. Presents with the sudden  onset of left-sided facial droop and left upper extremity weakness that  was stuttering, transient for 3 hours in duration or so and resolved  spontaneously. The patient was evaluated for intravenous tPA but as  the symptomatology had resolved, she was felt to be not a candidate. Her blood pressure documented on presentation was 194/104 at least  when Teleneurology evaluated her. Other symptoms include headache,  which has been ongoing for 2-3 weeks, intermittent chest pain, which  has been going on for 2-3 weeks, and this seems to coincide shortly  after initiation of Elavil that she takes for insomnia at night. She is  currently still prescribed Elavil. The patient had at least 1 more episode  of neurologic symptoms with a blood pressure of 181/93. The patient  had altered mental status. Please see Dr. Estephania Gaytan note, the first year  medical resident, on 2017 at 11:26 p.m. The patient's highest  blood pressure during the hospitalization was 001 systolic and 434  diastolic. There was concern that she may have been toxic narcotics  analgesics and Ativan for that confusional episode. The patient is now  back to her baseline with some headache. Current blood pressure  195/87. SOCIAL HISTORY: She has never smoked. She does have reactive  airway disease for which she takes inhalers for. She does not use illicit  substances.     REVIEW OF SYSTEMS: She denies any vision difficulties or other  complaints. Her 10-system review of systems otherwise as outlined  above. ALLERGIES: NO DRUG ALLERGIES. PHYSICAL EXAMINATION  GENERAL: She is awake. She is alert. NEUROLOGIC: With the history of strokes, she reports her stroke  caused right-sided weakness. All I see is perhaps a subtle facial droop  on the right. The remainder of her cranial nerves are intact. Pupils  equal, round and reactive. Hypertensive vascular changes on direct  funduscopic evaluation. Otherwise, no papilledema. Finger tapping is  clumsy bilaterally. Coordination is fine. There was no pronator drift. I  was able to get the patient up in the room to walk with touch assist.  She did not have truncal ataxia. There is no cortical sensory loss. DIAGNOSTIC STUDIES: EKG: Please note has the QT corrected to  484 which is prolonged. It was sinus. Echocardiogram with bubble study was normal.    MRI of brain only notable for some mild chronic microvascular  ischemic changes in the white matter. There was incidental note of a  small left mastoid effusion seen on MRI. This is typically over-called. I  reviewed this history and there was no clear history of sinus disease or  posterior ear pain sites type symptoms. ASSESSMENT: Patient with transient ischemic attack. Question if this  was in fact hypertensive encephalopathy. No need for permissive  hypertension. Please treat blood pressure to JNC Standard. She is  already on Plavix. The admission H and P suggests in the outpatient  setting she is on aspirin and Plavix. One could consider this for 90  days for stroke prevention. The long-term combination of Plavix and  aspirin is still unacceptable given the results of the MATCH trial. At this  point, the combination of aspirin and Plavix may be tricky given this  terrible blood pressure she is having. Now she started Elavil recently.  I  think the Elavil needs to be stopped because of her prolonged QT  interval but furthermore, it may be contributing to her blood pressure  issue. I would recommend discontinuing it and have already done so. Hemoglobin A1c to assess how her diabetes control has been doing. I  would recommend treating it to a level of 7 for long-term stroke  prevention. There is no assessment of her anterior circulation of the  carotids. Consider the role of a CTA; however, her creatinine is mildly  high. At this point, we will get a carotid duplex and if more defined or  better quality imaging is needed, the CTA could be considered in the  future. No further recommendations. KARLA Coronel MD    WT / Tyree Santo  D:  07/04/2017   17:16  T:  07/04/2017   17:54  Job #:  [id number]

## 2017-07-04 NOTE — PROGRESS NOTES
Problem: Self Care Deficits Care Plan (Adult)  Goal: *Acute Goals and Plan of Care (Insert Text)  Outcome: Resolved/Met Date Met:  07/04/17  OCCUPATIONAL THERAPY EVALUATION/DISCHARGE     Patient: Tahmina Roque (82 y.o. female)  Date: 7/4/2017  Primary Diagnosis: TIA (transient ischemic attack)  TIA (transient ischemic attack)        Precautions:   Fall      ASSESSMENT AND RECOMMENDATIONS:  Based on the objective data described below, the patient needed SBA during bed mobility and CGA/SBA with HHA during simulated toilet transfer/fucntional mobility tasks. Patient has WFL BUE AROM and strength. Patient was able to simulate LE self care tasks with SBA/CGA sitting on EOB. Educated patient on recommendation on use of her shower chair for safety during bathing tasks and having her  with her for safety. Patient deferred to PT for mobility. Skilled acute care occupational therapy is not indicated at this time. Discharge Recommendations: Home Health with 24/7 supervision for safety   Further Equipment Recommendations for Discharge: N/A       COMPLEXITY      Eval Complexity: History: LOW Complexity : Brief history review ; Examination: LOW Complexity : 1-3 performance deficits relating to physical, cognitive , or psychosocial skils that result in activity limitations and / or participation restrictions ; Decision Making:LOW Complexity : No comorbidities that affect functional and no verbal or physical assistance needed to complete eval tasks  Assessment: Low Complexity          G-CODES:      Self Care  Current  CI= 1-19%   Goal  CI= 1-19%   D/C  CI= 1-19%. The severity rating is based on the Level of Assistance required for Functional Mobility and ADLs. SUBJECTIVE:   Patient stated My  is there with me all the time.       OBJECTIVE DATA SUMMARY:       Past Medical History:   Diagnosis Date    Diabetes ([address]      Hypertension      Stroke [facility])     History reviewed.  No pertinent surgical history. Prior Level of Function/Home Situation: Patient reported she was independent in basic self care tasks and functional mobility PTA. Home Situation  Home Environment: Private residence  # Steps to Enter: 6  One/Two Story Residence: One story  Living Alone: No  Support Systems: Spouse/Significant Other/Partner  Patient Expects to be Discharged to[de-identified] Private residence  Current DME Used/Available at Home: [address], [place], straight, Shower chair, Raised toilet seat, Walker, rolling  Tub or Shower Type: Tub/Shower combination  [X]     Right hand dominant       [ ]     Left hand dominant  Cognitive/Behavioral Status:  Neurologic State: Alert  Orientation Level: Oriented X4  Cognition: Follows commands     Skin: No skin changes noted     Edema: No edema noted     Vision/Perceptual:       Acuity: Within Defined Limits       Coordination:  Coordination: Within functional limits (BUEs)       Balance:  Sitting: Intact  Standing: Impaired; With support (HHA)  Standing - Static: Good  Standing - Dynamic : Fair     Strength:  Strength:  Within functional limits (BUEs)      Tone & Sensation:  Tone: Normal (BUEs)  Sensation: Intact (BUEs)     Range of Motion:  AROM: Within functional limits (BUEs)     Functional Mobility and Transfers for ADLs:  Bed Mobility:  Supine to Sit: Stand-by asssistance  Sit to Supine: Stand-by asssistance  Scooting: Stand-by asssistance  Transfers:  Sit to Stand: Stand-by asssistance;Contact guard assistance              Toilet Transfer : Stand-by asssistance;Contact guard assistance (simulated with HHA)                 ADL Assessment:(clicnial judgement based on functional mobility)  Feeding: Independent     Oral Facial Hygiene/Grooming: Stand-by assistance     Bathing: Stand-by assistance;Contact guard assistance     Upper Body Dressing: Independent     Lower Body Dressing: Stand-by assistance     Toileting: Stand by assistance;Contact guard assistance      Pain:  Pt reports 5/10 pain or discomfort prior to treatment. (L side of abdomen)  Pt reports 5/10 pain or discomfort post treatment. Activity Tolerance:  Good     Please refer to the flowsheet for vital signs taken during this treatment. After treatment:   [ ]  Patient left in no apparent distress sitting up in chair  [X]  Patient left in no apparent distress in bed  [X]  Call bell left within reach  [ ]  Nursing notified  [X]  Caregiver present  [ ]  Bed alarm activated      COMMUNICATION/EDUCATION:   Communication/Collaboration:  [X]      Home safety education was provided and the patient/caregiver indicated understanding. [X]      Patient/family have participated as able and agree with findings and recommendations. [ ]      Patient is unable to participate in plan of care at this time.      Elliot Johnson, OTR/L  Time Calculation: 16 mins

## 2017-07-04 NOTE — DIABETES MGMT
GLYCEMIC CONTROL PLAN OF CARE     Assessment/Recommendations:  Blood glucose within targets, no correctional Lispro insulin has been required    Recommend discontinuing order for Levemir insulin     Continue inpatient monitoring and intervention    Most recent blood glucose values:  7/3/2017 11:29 7/3/2017 17:08 7/3/2017 22:28 7/4/2017 06:40 7/4/2017 11:38   166 (H) 79 86 123 (H) 127 (H)     Current A1C of 7.3% is equivalent to average blood glucose of 163 mg/dl over the past 2-3 months. Current hospital diabetes medications:   Levemir insulin 18 units daily with dinner  Correctional Lispro insulin 4 times daily ACHS (normal insulin sensitivity)     Previous day's insulin requirements:   None     Home diabetes medications:  Metformin   Glimepiride     Diet:  Diabetic consistent carbohdyrate    Education:  Pt visited, states her diabetes is well controlled at home. Pt follows up regularly with physician. Declined information on free outpatient diabetes education classes.      Tim Thomas RD, CDE

## 2017-07-04 NOTE — PROGRESS NOTES
responded to Rapid Response for  Galdino Fuller, who is a 78 y.o.,female,     The  provided the following Interventions:  Provided crisis spiritual care and support. Offered prayers on behalf for the patient. Chart reviewed. The following outcomes were achieved:  Patient had gone for MRI, retuened to room w/appearance of altered mental status. Doctor confirmed no stroke but patient may have had too much medication. Patient will be monitored throughout the night. Spouse at bedside. Plan:  Chaplains will continue to follow and will provide spiritual care as needed.  recommends bedside caregivers page  on duty if patient or family shows signs of acute spiritual or emotional distress.        Cheryl [address]  [phone]

## 2017-07-04 NOTE — PROGRESS NOTES
Problem: Mobility Impaired (Adult and Pediatric)  Goal: *Acute Goals and Plan of Care (Insert Text)  Physical Therapy Goals  Initiated 7/4/2017 and to be accomplished within 7 day(s)  1. Patient will move from supine to sit and sit to supine , scoot up and down and roll side to side in bed with supervision/set-up. 2. Patient will transfer from bed to chair and chair to bed with supervision/set-up using the least restrictive device. 3. Patient will perform sit to stand with supervision/set-up. 4. Patient will ambulate with supervision/set-up for >100 feet with the least restrictive device. 5. Patient will ascend/descend 2 stairs with 1 handrail(s) with supervision/set-up. 6. Patient will demonstrate good standing balance to improve safety with gait training. PHYSICAL THERAPY EVALUATION     Patient: Jovita Castle (40 y.o. female)  Date: 7/4/2017  Primary Diagnosis: TIA (transient ischemic attack)  TIA (transient ischemic attack)        Precautions: Fall         ASSESSMENT :  Pt is 67yo F admitted to hospital for TIA and presents today alert and agreeable to therapy. Pt transferred from sup to sit and stood quickly at bedside stating, \"I have to go to the bathroom\"; pt instructed on safety awareness as IV pole still plugged in and pt balance unsteady. Pt then transferred again to standing and ambulated into bathroom where she transferred on/off commode. Pt then ambulated additional 125ft with RW at CG/Cleo. Pt demonstrated decreased balance, safety awareness, and required VC's for walker management and for appropriate step length. Pt then transferred back to supine in bed and was left resting with call bell by her side. Pt and  in room informed of PT recommendation of 24/7 supervision at home due to safety concerns; they acknowledged understanding. Pt currently demonstrates decreased mobility and endurance and will benefit from skilled intervention to address the above impairments.   Patients rehabilitation potential is considered to be Good  Factors which may influence rehabilitation potential include:   [ ]         None noted  [ ]         Mental ability/status  [X]         Medical condition  [X]         Home/family situation and support systems  [X]         Safety awareness  [ ]         Pain tolerance/management  [ ]         Other:        PLAN :  Recommendations and Planned Interventions:  [X]           Bed Mobility Training             [X]    Neuromuscular Re-Education  [X]           Transfer Training                   [ ]    Orthotic/Prosthetic Training  [X]           Gait Training                          [ ]    Modalities  [X]           Therapeutic Exercises          [ ]    Edema Management/Control  [X]           Therapeutic Activities            [X]    Patient and Family Training/Education  [ ]           Other (comment):     Frequency/Duration: Patient will be followed by physical therapy 1-2 times per day/4-7 days per week to address goals. Discharge Recommendations: Home Health with 24/7 supervision  Further Equipment Recommendations for Discharge: rolling walker       G-CODES      Mobility  Current  CI= 1-19%   Goal  CI= 1-19%. The severity rating is based on the Level of Assistance required for Functional Mobility and ADLs.            G-CODES      Eval Complexity: History: MEDIUM  Complexity : 1-2 comorbidities / personal factors will impact the outcome/ POC Exam:MEDIUM Complexity : 3 Standardized tests and measures addressing body structure, function, activity limitation and / or participation in recreation  Presentation: LOW Complexity : Stable, uncomplicated  Clinical Decision Making:Low Complexity   Overall Complexity:LOW       SUBJECTIVE:   Patient stated I feel much better today.       OBJECTIVE DATA SUMMARY:       Past Medical History:   Diagnosis Date    Diabetes ([facility]      Hypertension      Stroke [facility])     History reviewed. No pertinent surgical history.   Prior Level of Function/Home Situation: Pt lives with  in 1 story with 2STE and was using a SPC for mobility PTA. Pt would use motorized scooter for community distances at [employer] store. Home Situation  Home Environment: Private residence  # Steps to Enter: 6  One/Two Story Residence: One story  Living Alone: No  Support Systems: Family member(s), Mosque / leah community, Friends \ neighbors  Patient Expects to be Discharged to[de-identified] Private residence  Current DME Used/Available at Home: Tyrel, [employer], jacqueline  Critical Behavior:  Neurologic State: Alert  Orientation Level: Oriented X4  Cognition: Appropriate decision making; Follows commands    Decreased safety awareness  Strength:    Strength: Within functional limits (BLE)   Tone & Sensation:    Sensation: Intact (BLE to LT)   Range Of Motion:  AROM: Within functional limits (BLE)   Functional Mobility:  Bed Mobility:   Supine to Sit: Stand-by asssistance  Sit to Supine: Stand-by asssistance  Scooting: Stand-by asssistance  Transfers:  Sit to Stand: Stand-by asssistance  Stand to Sit: Stand-by asssistance      Balance:   Sitting: Intact; With support  Standing: Impaired; With support  Standing - Static: Fair  Standing - Dynamic : Fair  Ambulation/Gait Training:  Distance (ft): 125 Feet (ft)  Assistive Device: Walker, rolling  Ambulation - Level of Assistance: Contact guard assistance (Occasional Cleo for walker management)   Gait Abnormalities: Decreased step clearance; Path deviations;Trunk sway increased   Base of Support: Narrowed   Speed/Massiel: Fluctuations; Slow   Swing Pattern: Right asymmetrical   Interventions: Safety awareness training; Tactile cues; Verbal cues; Visual/Demos     Pain:  Pt reports 0/10 pain or discomfort prior to treatment. Pt reports 0/10 pain or discomfort post treatment. Activity Tolerance:   Pt tolerated activity well and denied dizziness during activity.  Pt demonstrates decreased safety awareness and insight into deficits and will benefit from skilled PT. Please refer to the flowsheet for vital signs taken during this treatment. After treatment:   [ ]         Patient left in no apparent distress sitting up in chair  [X]         Patient left in no apparent distress in bed  [X]         Call bell left within reach  [ ]         Nursing notified  [X]         Caregiver present  [ ]         Bed alarm activated      COMMUNICATION/EDUCATION:   [X]         Fall prevention education was provided and the patient/caregiver indicated understanding. [X]         Patient/family have participated as able in goal setting and plan of care. [X]         Patient/family agree to work toward stated goals and plan of care. [ ]         Patient understands intent and goals of therapy, but is neutral about his/her participation. [ ]         Patient is unable to participate in goal setting and plan of care.      Thank you for this referral.  Tricia Alcantara, PT   Time Calculation: 15 mins

## 2017-07-04 NOTE — ROUTINE PROCESS
Bedside shift change report given to Yane William (oncoming nurse) by Ruby Benavidez (offgoing nurse). Report included the following information SBAR, Intake/Output, MAR, Recent Results and Cardiac Rhythm . Irena Johnston

## 2017-07-04 NOTE — ROUTINE PROCESS
Bedside and Verbal shift change report given to DELMA Mcdowell (oncoming nurse) by Khoa Lozada RN (offgoing nurse). Report included the following information SBAR, Kardex, Intake/Output, MAR and Recent Results.

## 2017-07-04 NOTE — PROGRESS NOTES
PT eval order received and chart reviewed. Pt has active bedrest orders conflicting with PT eval order. Please D/C bedrest to allow full patient participation in therapy. Thank you for this referral. La Penn, PT, DPT.

## 2017-07-04 NOTE — PROGRESS NOTES
[facility]  Progress Note    Patient: [facility] Age: 78 y.o. : 1950 MR#: [id number] SSN: [id number]  Date/Time: 2017 2:25 PM    Subjective:     Patient lying in bed in NAD, awake, alert, follows commands, denies headaches, numbness.  and son at bedside.  Per  patient is at baseline Mental state now    Assessment/Plan:     1- TIA versus hypertensive encephalopathy  2- Uncontrolled HTN  3- DM2  4- Dyslipidemia    PLAN  Resume plavix and statin, Patient states she does not take aspirin at home  On norvasc, lisinopril, metoprolol, prn hyralazine, prn labetalol  Lantus, ssi  Neuro consult  D/w patient and family    Case discussed with:  []Patient  []Family  []Nursing  []Case Management  DVT Prophylaxis:  []Lovenox  []Hep SQ  []SCDs  []Coumadin   []On Heparin gtt    Objective:   VS:   Visit Vitals    /89 (BP 1 Location: Left arm, BP Patient Position: At rest)    Pulse 74    Temp 99.1 °F (37.3 °C)    Resp 18    Ht 5' 6\" (1.676 m)    Wt 89.1 kg (196 lb 8 oz)    SpO2 95%    Breastfeeding No    BMI 31.72 kg/m2      Tmax/24hrs: Temp (24hrs), Av °F (36.7 °C), Min:97.7 °F (36.5 °C), Max:99.1 °F (37.3 °C)    Intake/Output Summary (Last 24 hours) at 17 1425  Last data filed at 17 1321   Gross per 24 hour   Intake              480 ml   Output              200 ml   Net              280 ml       General:  Awake, alert  Cardiovascular:  S1S2+, RRR  Pulmonary:  CTA b/l  GI:  Soft, BS+, NT, ND  Extremities:  No edema      Labs:    Recent Results (from the past 24 hour(s))   GLUCOSE, POC    Collection Time: 17  5:08 PM   Result Value Ref Range    Glucose (POC) 79 70 - 110 mg/dL   GLUCOSE, POC    Collection Time: 17 10:28 PM   Result Value Ref Range    Glucose (POC) 86 70 - 110 mg/dL   EKG, 12 LEAD, SUBSEQUENT    Collection Time: 17 10:39 PM   Result Value Ref Range    Ventricular Rate 69 BPM    Atrial Rate 69 BPM P-R Interval 218 ms    QRS Duration 86 ms    Q-T Interval 452 ms    QTC Calculation (Bezet) 484 ms    Calculated P Axis 57 degrees    Calculated R Axis 9 degrees    Calculated T Axis 13 degrees    Diagnosis       Sinus rhythm with 1st degree AV block  Prolonged QT  Abnormal ECG  When compared with ECG of 03-JUL-2017 13:38,  No significant change was found  Confirmed by Jatinder Phoenix MD, ----- ([id number]) on 7/4/2017 1:30:21 PM     CBC WITH AUTOMATED DIFF    Collection Time: 07/03/17 10:44 PM   Result Value Ref Range    WBC 9.9 4.6 - 13.2 K/uL    RBC 4.63 4.20 - 5.30 M/uL    HGB 12.5 12.0 - 16.0 g/dL    HCT 35.4 35.0 - 45.0 %    MCV 76.5 74.0 - 97.0 FL    MCH 27.0 24.0 - 34.0 PG    MCHC 35.3 31.0 - 37.0 g/dL    RDW 13.9 11.6 - 14.5 %    PLATELET 732 196 - 836 K/uL    MPV 10.5 9.2 - 11.8 FL    NEUTROPHILS 43 40 - 73 %    LYMPHOCYTES 43 21 - 52 %    MONOCYTES 8 3 - 10 %    EOSINOPHILS 5 0 - 5 %    BASOPHILS 1 0 - 2 %    ABS. NEUTROPHILS 4.4 1.8 - 8.0 K/UL    ABS. LYMPHOCYTES 4.2 (H) 0.9 - 3.6 K/UL    ABS. MONOCYTES 0.7 0.05 - 1.2 K/UL    ABS. EOSINOPHILS 0.5 (H) 0.0 - 0.4 K/UL    ABS.  BASOPHILS 0.1 0.0 - 0.1 K/UL    DF AUTOMATED     PRO-BNP    Collection Time: 07/03/17 10:44 PM   Result Value Ref Range    NT pro- 0 - 900 PG/ML   MAGNESIUM    Collection Time: 07/03/17 10:44 PM   Result Value Ref Range    Magnesium 1.8 1.6 - 2.6 mg/dL   LACTIC ACID    Collection Time: 07/03/17 10:44 PM   Result Value Ref Range    Lactic acid 1.7 0.4 - 2.0 MMOL/L   METABOLIC PANEL, BASIC    Collection Time: 07/04/17  3:28 AM   Result Value Ref Range    Sodium 141 136 - 145 mmol/L    Potassium 3.7 3.5 - 5.5 mmol/L    Chloride 105 100 - 108 mmol/L    CO2 27 21 - 32 mmol/L    Anion gap 9 3.0 - 18 mmol/L    Glucose 94 74 - 99 mg/dL    BUN 11 7.0 - 18 MG/DL    Creatinine 0.96 0.6 - 1.3 MG/DL    BUN/Creatinine ratio 11 (L) 12 - 20      GFR est AA >60 >60 ml/min/1.73m2    GFR est non-AA 58 (L) >60 ml/min/1.73m2    Calcium 8.7 8.5 - 10.1 MG/DL   LIPID PANEL    Collection Time: 07/04/17  3:28 AM   Result Value Ref Range    LIPID PROFILE          Cholesterol, total 106 <200 MG/DL    Triglyceride 92 <150 MG/DL    HDL Cholesterol 48 40 - 60 MG/DL    LDL, calculated 39.6 0 - 100 MG/DL    VLDL, calculated 18.4 MG/DL    CHOL/HDL Ratio 2.2 0 - 5.0     CBC WITH AUTOMATED DIFF    Collection Time: 07/04/17  3:28 AM   Result Value Ref Range    WBC 7.7 4.6 - 13.2 K/uL    RBC 4.67 4.20 - 5.30 M/uL    HGB 12.6 12.0 - 16.0 g/dL    HCT 35.6 35.0 - 45.0 %    MCV 76.2 74.0 - 97.0 FL    MCH 27.0 24.0 - 34.0 PG    MCHC 35.4 31.0 - 37.0 g/dL    RDW 14.0 11.6 - 14.5 %    PLATELET 439 481 - 013 K/uL    MPV 10.8 9.2 - 11.8 FL    NEUTROPHILS 41 40 - 73 %    LYMPHOCYTES 45 21 - 52 %    MONOCYTES 8 3 - 10 %    EOSINOPHILS 6 (H) 0 - 5 %    BASOPHILS 0 0 - 2 %    ABS. NEUTROPHILS 3.1 1.8 - 8.0 K/UL    ABS. LYMPHOCYTES 3.5 0.9 - 3.6 K/UL    ABS. MONOCYTES 0.6 0.05 - 1.2 K/UL    ABS. EOSINOPHILS 0.5 (H) 0.0 - 0.4 K/UL    ABS.  BASOPHILS 0.0 0.0 - 0.1 K/UL    DF AUTOMATED     GLUCOSE, POC    Collection Time: 07/04/17  6:40 AM   Result Value Ref Range    Glucose (POC) 123 (H) 70 - 110 mg/dL   GLUCOSE, POC    Collection Time: 07/04/17 11:38 AM   Result Value Ref Range    Glucose (POC) 127 (H) 70 - 110 mg/dL       Signed By: Heidi Kumari MD     July 4, 2017

## 2017-07-04 NOTE — PROGRESS NOTES
Rapid Response Note  [facility]    Patient: Jasbir Dunne 78 y.o. female  [id number]  1950      Admit Date: 7/3/2017   Admission Diagnosis: TIA (transient ischemic attack)  TIA (transient ischemic attack)    RAPID RESPONSE     Rapid response called for altered mental status. Patient was admitted earlier today for TIA which had resolved once she reached the floor. Patient was given 1mg Ativan IV around 9:20pm today prior to MRI. When patient returned from MRI, no neurological deficits or confusion was noted. Roughly 30 minutes after returning to the floor post-MRI, nursing was staff was called by patient's . Patient was noted to be sitting upright in bed and mumbling. After responding to the rapid, patient was notably confused and was unable to follow direct commands. Patient's BP was also noticeable elevated to the 200's/100's. Neurology was consulted via telehealth. Medications Reviewed    OBJECTIVE     Visit Vitals    BP (!) 181/93    Pulse 75    Temp 97.7 °F (36.5 °C)    Resp 16    Ht 5' 6\" (1.676 m)    Wt 91.1 kg (200 lb 14.4 oz)    SpO2 98%    Breastfeeding No    BMI 32.43 kg/m2       PHYSICAL:  General:  Arousable, lethargic, cooperative   CV:  RRR, no murmurs, rubs, or gallops. PMI not displaced. RESP:  Unlabored breathing. Lungs clear to auscultation. no wheeze, rales, or rhonchi. Equal expansion bilaterally. ABD:  Soft, nontender, nondistended. Normoactive bowel sounds. Neuro:  Patient was able to lift bilateral upper extremities and lower extremities. Strength and sensory testing were unreliable due to patient's confusion/altered mental status. A&O x1 (self only)    Medications administered: 10mg Hydralazine    EKG: Normal sinus rhythm; no ST changes    Labs: Reviewed    ASSESSMENT, PLAN & DISPOSITION   Jasbir Dunne is a 78y.o. year old female admitted for TIA (transient ischemic attack)  TIA (transient ischemic attack).  Rapid response called for altered mental status. Patient condition currently: stable. Per Tele-neurology: Symptoms most-likely d/t toxic encephalopathy 2/2 opioid and ativan medications. MRI Head was negative for acute stroke. -IV Hydralazine for HTN management  -Q2H neuro checks  -Avoid sedative medications      Disposition: -[facility]    Attending Dr. Demetrio Sutherland notified of rapid response. In agreement with plan. Primary team resuming care.        Cookie Spatz, MD   [facility]  PGY-1  11:26 PM

## 2017-07-05 VITALS
DIASTOLIC BLOOD PRESSURE: 88 MMHG | SYSTOLIC BLOOD PRESSURE: 166 MMHG | OXYGEN SATURATION: 97 % | HEART RATE: 72 BPM | WEIGHT: 196.5 LBS | RESPIRATION RATE: 17 BRPM | TEMPERATURE: 98.4 F | BODY MASS INDEX: 31.58 KG/M2 | HEIGHT: 66 IN

## 2017-07-05 LAB
ANION GAP BLD CALC-SCNC: 7 MMOL/L (ref 3–18)
ATRIAL RATE: 78 BPM
BASOPHILS # BLD AUTO: 0 K/UL (ref 0–0.1)
BASOPHILS # BLD: 1 % (ref 0–2)
BUN SERPL-MCNC: 11 MG/DL (ref 7–18)
BUN/CREAT SERPL: 10 (ref 12–20)
CALCIUM SERPL-MCNC: 9.1 MG/DL (ref 8.5–10.1)
CALCULATED P AXIS, ECG09: 61 DEGREES
CALCULATED R AXIS, ECG10: 42 DEGREES
CALCULATED T AXIS, ECG11: 33 DEGREES
CHLORIDE SERPL-SCNC: 105 MMOL/L (ref 100–108)
CO2 SERPL-SCNC: 28 MMOL/L (ref 21–32)
CREAT SERPL-MCNC: 1.1 MG/DL (ref 0.6–1.3)
DIAGNOSIS, 93000: NORMAL
DIFFERENTIAL METHOD BLD: ABNORMAL
EOSINOPHIL # BLD: 0.4 K/UL (ref 0–0.4)
EOSINOPHIL NFR BLD: 6 % (ref 0–5)
ERYTHROCYTE [DISTWIDTH] IN BLOOD BY AUTOMATED COUNT: 14 % (ref 11.6–14.5)
GLUCOSE BLD STRIP.AUTO-MCNC: 116 MG/DL (ref 70–110)
GLUCOSE BLD STRIP.AUTO-MCNC: 132 MG/DL (ref 70–110)
GLUCOSE SERPL-MCNC: 113 MG/DL (ref 74–99)
HCT VFR BLD AUTO: 35.9 % (ref 35–45)
HGB BLD-MCNC: 12.7 G/DL (ref 12–16)
INR PPP: 1 (ref 0.8–1.2)
LYMPHOCYTES # BLD AUTO: 45 % (ref 21–52)
LYMPHOCYTES # BLD: 3.2 K/UL (ref 0.9–3.6)
MAGNESIUM SERPL-MCNC: 2.1 MG/DL (ref 1.6–2.6)
MCH RBC QN AUTO: 27.1 PG (ref 24–34)
MCHC RBC AUTO-ENTMCNC: 35.4 G/DL (ref 31–37)
MCV RBC AUTO: 76.5 FL (ref 74–97)
MONOCYTES # BLD: 0.7 K/UL (ref 0.05–1.2)
MONOCYTES NFR BLD AUTO: 9 % (ref 3–10)
NEUTS SEG # BLD: 2.8 K/UL (ref 1.8–8)
NEUTS SEG NFR BLD AUTO: 39 % (ref 40–73)
P-R INTERVAL, ECG05: 186 MS
PLATELET # BLD AUTO: 243 K/UL (ref 135–420)
PMV BLD AUTO: 10.6 FL (ref 9.2–11.8)
POTASSIUM SERPL-SCNC: 3.7 MMOL/L (ref 3.5–5.5)
PROTHROMBIN TIME: 12.6 SEC (ref 11.5–15.2)
Q-T INTERVAL, ECG07: 434 MS
QRS DURATION, ECG06: 86 MS
QTC CALCULATION (BEZET), ECG08: 494 MS
RBC # BLD AUTO: 4.69 M/UL (ref 4.2–5.3)
SODIUM SERPL-SCNC: 140 MMOL/L (ref 136–145)
VENTRICULAR RATE, ECG03: 78 BPM
WBC # BLD AUTO: 7.1 K/UL (ref 4.6–13.2)

## 2017-07-05 PROCEDURE — 36415 COLL VENOUS BLD VENIPUNCTURE: CPT | Performed by: EMERGENCY MEDICINE

## 2017-07-05 PROCEDURE — 74011250637 HC RX REV CODE- 250/637: Performed by: EMERGENCY MEDICINE

## 2017-07-05 PROCEDURE — 80048 BASIC METABOLIC PNL TOTAL CA: CPT | Performed by: EMERGENCY MEDICINE

## 2017-07-05 PROCEDURE — 82962 GLUCOSE BLOOD TEST: CPT

## 2017-07-05 PROCEDURE — 85025 COMPLETE CBC W/AUTO DIFF WBC: CPT | Performed by: EMERGENCY MEDICINE

## 2017-07-05 PROCEDURE — 83735 ASSAY OF MAGNESIUM: CPT | Performed by: EMERGENCY MEDICINE

## 2017-07-05 PROCEDURE — 97116 GAIT TRAINING THERAPY: CPT

## 2017-07-05 PROCEDURE — 97530 THERAPEUTIC ACTIVITIES: CPT

## 2017-07-05 PROCEDURE — 93880 EXTRACRANIAL BILAT STUDY: CPT

## 2017-07-05 PROCEDURE — 74011250637 HC RX REV CODE- 250/637: Performed by: INTERNAL MEDICINE

## 2017-07-05 PROCEDURE — 74011250636 HC RX REV CODE- 250/636: Performed by: INTERNAL MEDICINE

## 2017-07-05 PROCEDURE — 85610 PROTHROMBIN TIME: CPT | Performed by: EMERGENCY MEDICINE

## 2017-07-05 RX ORDER — AMLODIPINE BESYLATE 10 MG/1
10 TABLET ORAL DAILY
Qty: 30 TAB | Refills: 0 | Status: SHIPPED | OUTPATIENT
Start: 2017-07-05

## 2017-07-05 RX ORDER — METOPROLOL TARTRATE 25 MG/1
12.5 TABLET, FILM COATED ORAL 2 TIMES DAILY
Qty: 30 TAB | Refills: 0 | Status: SHIPPED | OUTPATIENT
Start: 2017-07-05

## 2017-07-05 RX ORDER — FAMOTIDINE 20 MG/1
20 TABLET, FILM COATED ORAL DAILY
Qty: 30 TAB | Refills: 0 | Status: SHIPPED | OUTPATIENT
Start: 2017-07-05

## 2017-07-05 RX ADMIN — HEPARIN SODIUM 5000 UNITS: 5000 INJECTION, SOLUTION INTRAVENOUS; SUBCUTANEOUS at 13:11

## 2017-07-05 RX ADMIN — HEPARIN SODIUM 5000 UNITS: 5000 INJECTION, SOLUTION INTRAVENOUS; SUBCUTANEOUS at 05:18

## 2017-07-05 RX ADMIN — LISINOPRIL 10 MG: 10 TABLET ORAL at 08:32

## 2017-07-05 RX ADMIN — CLOPIDOGREL BISULFATE 75 MG: 75 TABLET ORAL at 08:33

## 2017-07-05 RX ADMIN — METOPROLOL TARTRATE 12.5 MG: 25 TABLET ORAL at 08:33

## 2017-07-05 RX ADMIN — AMLODIPINE BESYLATE 10 MG: 10 TABLET ORAL at 08:33

## 2017-07-05 NOTE — ROUTINE PROCESS
Stroke Education provided to patient and the following topics were discussed    1. Patients personal risk factors for stroke are hypertension, hyperlipidemia, diabetes mellitus and HgA1C    2. Warning signs of Stroke:        * Sudden numbness or weakness of the face, arm or leg, especially on one side of          The body            * Sudden confusion, trouble speaking or understanding        * Sudden trouble seeing in one or both eyes        * Sudden trouble walking, dizziness, loss of balance or coordination        * Sudden severe headache with no known cause      3. Importance of activation Emergency Medical Services ( 9-1-1 ) immediately if experience any warning signs of stroke. 4. Be sure and schedule a follow-up appointment with your primary care doctor or any specialists as instructed. 5. You must take medicine every day to treat your risk factors for stroke. Be sure to take your medicines exactly as your doctor tells you: no more, no less. Know what your medicines are for , what they do. Anti-thrombotics /anticoagulants can help prevent strokes. You are taking the following medicine(s) plavix. 6.  Smoking and second-hand smoke greatly increase your risk of stroke, cardiovascular disease and death. Smoking history never    7. Information provided was [facility] Stroke Education Binder    8. Documentation of teaching completed in Patient Education Activity and on Care Plan with teaching response noted?   yes

## 2017-07-05 NOTE — PROGRESS NOTES
Care Management Interventions  Transition of Care Consult (CM Consult): [address]: No  Reason Outside [place]: Out of service area  Physical Therapy Consult: Yes  Occupational Therapy Consult: Yes  Current Support Network: Lives with Spouse  Confirm Follow Up Transport: Family  Plan discussed with Pt/Family/Caregiver: Yes  Freedom of Choice Offered: Yes (For home health)  Discharge Location  Discharge Placement: Home with home health     Pt is being discharged home with home health. FOC presented and pt was given options of [address]. Pt chooses [facility] ([phone]). Information submitted and call made to agency [facility]) to inform. Agency is requesting a face to face encounter, and summary. Physician informed.

## 2017-07-05 NOTE — DISCHARGE INSTRUCTIONS
Transient Ischemic Attack: Care Instructions  Your Care Instructions    A transient ischemic attack (TIA) is when blood flow to a part of your brain is blocked for a short time. A TIA is like a stroke but usually lasts only a few minutes. A TIA does not cause lasting brain damage. Any vision problems, slurred speech, or other symptoms usually go away in 10 to 20 minutes. But they may last for up to 24 hours. TIAs are often warning signs of a stroke. Some people who have a TIA may have a stroke in the future. A stroke can cause symptoms like those of a TIA. But a stroke causes lasting damage to your brain. You can take steps to help prevent a stroke. One thing you can do is get early treatment. If you have other new symptoms, or if your symptoms do not get better, go back to the emergency room or call your doctor right away. Getting treatment right away may prevent long-term brain damage caused by a stroke. The doctor has checked you carefully, but problems can develop later. If you notice any problems or new symptoms, get medical treatment right away. Follow-up care is a key part of your treatment and safety. Be sure to make and go to all appointments, and call your doctor if you are having problems. It's also a good idea to know your test results and keep a list of the medicines you take. How can you care for yourself at home? Medicines  · Be safe with medicines. Take your medicines exactly as prescribed. Call your doctor if you think you are having a problem with your medicine. · If you take a blood thinner, such as aspirin, be sure you get instructions about how to take your medicine safely. Blood thinners can cause serious bleeding problems. · Call your doctor if you are not able to take your medicines for any reason. · Do not take any over-the-counter medicines or herbal products without talking to your doctor first.  · If you take birth control pills or hormone therapy, talk to your doctor.  Ask if these treatments are right for you. Lifestyle changes  · Do not smoke. If you need help quitting, talk to your doctor about stop-smoking programs and medicines. · Be active. If your doctor recommends it, get more exercise. Walking is a good choice. Bit by bit, increase the amount you walk every day. Try for at least 30 minutes on most days of the week. You also may want to swim, bike, or do other activities. · Eat heart-healthy foods. These include fruits, vegetables, high-fiber foods, fish, and foods that are low in sodium, saturated fat, and trans fat. · Stay at a healthy weight. Lose weight if you need to. · Limit alcohol to 2 drinks a day for men and 1 drink a day for women. Staying healthy  · Manage other health problems such as diabetes, high blood pressure, and high cholesterol. · Get the flu vaccine every year. When should you call for help? Call 911 anytime you think you may need emergency care. For example, call if:  · You have new or worse symptoms of a stroke. These may include:  ¨ Sudden numbness, tingling, weakness, or loss of movement in your face, arm, or leg, especially on only one side of your body. ¨ Sudden vision changes. ¨ Sudden trouble speaking. ¨ Sudden confusion or trouble understanding simple statements. ¨ Sudden problems with walking or balance. ¨ A sudden, severe headache that is different from past headaches. Call 911 even if these symptoms go away in a few minutes. · You feel like you are having another TIA. Watch closely for changes in your health, and be sure to contact your doctor if you have any problems. Where can you learn more? Go to http://lara-lawson.info/. Enter [phone] in the search box to learn more about \"Transient Ischemic Attack: Care Instructions. \"  Current as of: March 20, 2017  Content Version: 11.3  © 4227-4505 [employer].  Care instructions adapted under license by [employer] (which disclaims liability or warranty for this information). If you have questions about a medical condition or this instruction, always ask your healthcare professional. [place] [address] any warranty or liability for your use of this information. DISCHARGE SUMMARY from Nurse    The following personal items are in your possession at time of discharge:    Dental Appliances: None  Visual Aid: Glasses, At bedside     Home Medications: Sent home  Jewelry: None  Clothing: Undergarments, Pants, Slippers  Other Valuables: None             PATIENT INSTRUCTIONS:    After general anesthesia or intravenous sedation, for 24 hours or while taking prescription Narcotics:  · Limit your activities  · Do not drive and operate hazardous machinery  · Do not make important personal or business decisions  · Do  not drink alcoholic beverages  · If you have not urinated within 8 hours after discharge, please contact your surgeon on call. Report the following to your surgeon:  · Excessive pain, swelling, redness or odor of or around the surgical area  · Temperature over 100.5  · Nausea and vomiting lasting longer than 4 hours or if unable to take medications  · Any signs of decreased circulation or nerve impairment to extremity: change in color, persistent  numbness, tingling, coldness or increase pain  · Any questions        What to do at Home:  Recommended activity: Activity as tolerated and No driving until released by primary care physician. If you experience any of the following symptoms dizziness, weakness in arms or legs, slurred speech, facial droop, please follow up by calling 911 and reporting to nearest emergency department. *  Please give a list of your current medications to your Primary Care Provider. *  Please update this list whenever your medications are discontinued, doses are      changed, or new medications (including over-the-counter products) are added.     *  Please carry medication information at all times in case of emergency situations. These are general instructions for a healthy lifestyle:    No smoking/ No tobacco products/ Avoid exposure to second hand smoke    Surgeon General's Warning:  Quitting smoking now greatly reduces serious risk to your health. Obesity, smoking, and sedentary lifestyle greatly increases your risk for illness    A healthy diet, regular physical exercise & weight monitoring are important for maintaining a healthy lifestyle    You may be retaining fluid if you have a history of heart failure or if you experience any of the following symptoms:  Weight gain of 3 pounds or more overnight or 5 pounds in a week, increased swelling in our hands or feet or shortness of breath while lying flat in bed. Please call your doctor as soon as you notice any of these symptoms; do not wait until your next office visit. Recognize signs and symptoms of STROKE:    F-face looks uneven    A-arms unable to move or move unevenly    S-speech slurred or non-existent    T-time-call 911 as soon as signs and symptoms begin-DO NOT go       Back to bed or wait to see if you get better-TIME IS BRAIN. Warning Signs of HEART ATTACK     Call 911 if you have these symptoms:   Chest discomfort. Most heart attacks involve discomfort in the center of the chest that lasts more than a few minutes, or that goes away and comes back. It can feel like uncomfortable pressure, squeezing, fullness, or pain.  Discomfort in other areas of the upper body. Symptoms can include pain or discomfort in one or both arms, the back, neck, jaw, or stomach.  Shortness of breath with or without chest discomfort.  Other signs may include breaking out in a cold sweat, nausea, or lightheadedness. Don't wait more than five minutes to call 911 - MINUTES MATTER! Fast action can save your life. Calling 911 is almost always the fastest way to get lifesaving treatment.  Emergency Medical Services staff can begin treatment when they arrive -- up to an hour sooner than if someone gets to the hospital by car. The discharge information has been reviewed with the patient and spouse. The patient and spouse verbalized understanding. Discharge medications reviewed with the patient and spouse and appropriate educational materials and side effects teaching were provided.

## 2017-07-05 NOTE — PROGRESS NOTES
Patient is seen and evaluated. No interval events of confusion or transient ischemic attack symptomatology. Chest discomfort has resolved. Blood pressure has declined. Temp: 98 °F (36.7 °C) (07/05/17 0800) Pulse (Heart Rate): 81 (07/05/17 0800) Resp Rate: 17 (07/05/17 0800) BP: 147/88 (07/05/17 0800) O2 Sat (%): 95 % (07/05/17 0800) Weight: 89.1 kg (196 lb 8 oz) (07/04/17 0755)       Awake alert, pleasant. Heart was regular. Carotid duplex scan normal.  Assessment:    Patient with hypertensive encephalopathy. Improvement after discontinuation of tri-cyclic antidepressant. Blood pressure has come down. I will sign off. I will leave as a recommendation to recheck EKG in the future to ensure Q  T interval has improved.

## 2017-07-05 NOTE — ROUTINE PROCESS
Discharge instructions provided to patient and education explained to patient. Verbalized understanding.  at bedside. Belongings packed.  Darci Onofre

## 2017-07-05 NOTE — PROGRESS NOTES
Problem: Mobility Impaired (Adult and Pediatric)  Goal: *Acute Goals and Plan of Care (Insert Text)  Physical Therapy Goals  Initiated 7/4/2017 and to be accomplished within 7 day(s)  1. Patient will move from supine to sit and sit to supine , scoot up and down and roll side to side in bed with supervision/set-up. 2. Patient will transfer from bed to chair and chair to bed with supervision/set-up using the least restrictive device. 3. Patient will perform sit to stand with supervision/set-up. 4. Patient will ambulate with supervision/set-up for >100 feet with the least restrictive device. 5. Patient will ascend/descend 2 stairs with 1 handrail(s) with supervision/set-up. 6. Patient will demonstrate good standing balance to improve safety with gait training. PHYSICAL THERAPY TREATMENT     Patient: Tahmina Roque (83 y.o. female)  Date: 7/5/2017  Diagnosis: TIA (transient ischemic attack)  TIA (transient ischemic attack) <principal problem not specified>       Precautions: Fall  Chart, physical therapy assessment, plan of care and goals were reviewed. ASSESSMENT:  Increased activity tolerance noted as pt is able to ambulate improved single trial distance with slightly decreased assist.  Pt has improved overall mentation as she is oriented x's 4,however, req's substantial cues throughout for safety awareness and sequencing. [facility] to promote increased strength,increased activity tolerance,and improved standing balance. RW appears too cumbersome for pt as management is difficult during turns and obstacle negotiation. Pt will benefit from cont'd use of SPC.   Progression toward goals:  [X]      Improving appropriately and progressing toward goals  [ ]      Improving slowly and progressing toward goals  [ ]      Not making progress toward goals and plan of care will be adjusted       PLAN:   Patient continues to benefit from skilled intervention to address the above impairments. Continue treatment per established plan of care. Discharge Recommendations:  Home Health  Further Equipment Recommendations for Discharge:  Pt reports having SPC       SUBJECTIVE:   Patient stated It feels good to be walking.       OBJECTIVE DATA SUMMARY:   Critical Behavior:  Neurologic State: Alert  Orientation Level: Oriented X4  Cognition: Follows commands     Functional Mobility Training:  Bed Mobility:  Rolling: Supervision  Supine to Sit: Supervision; Additional time  Transfers:  Sit to Stand: Stand-by asssistance;Supervision (x's 6 consecutive trials w/o AD (verbal/visual cues for tech)  Stand to Sit: Stand-by asssistance;Supervision   Pt presents with processing/sequenceing deficit as moderate verbal cues and tactile cues req'd for sit<>stand positioning,sequence, and technique. Balance:  Sitting: Intact  Standing: Impaired; With support  Standing - Static: Fair  Standing - Dynamic : Fair  Ambulation/Gait Training:  Distance (ft): 165 Feet (ft) (30 ft with HHA (CG-min A))  Assistive Device: Walker, rolling  Ambulation - Level of Assistance: Stand-by asssistance  Gait Abnormalities: Decreased step clearance; Path deviations  Base of Support: Narrowed  Speed/Massiel: Slow  Pain:  Pt reports 0/10 pain or discomfort prior to treatment. Pt reports 0/10 pain or discomfort post treatment. Pt does endorse intermittent R LE pain in posterior knee. Activity Tolerance:   Fair/fair+  Please refer to the flowsheet for vital signs taken during this treatment.   After treatment:   [ ] Patient left in no apparent distress sitting up in chair  [X] Patient left in no apparent distress seated edge of bed  [X] Call bell left within reach  [ ] Nursing notified  [X] Caregiver present  [ ] Bed alarm activated      Mounika Lujan PTA   Time Calculation: 25 mins

## 2017-07-05 NOTE — PROCEDURES
[facility]  *** [facility] ***    Name: Alaina Pal  MRN: [id number]    Inpatient  : 23 Dec 1950  HIS Order #: [id number]  [id number] [facility] Visit #: [id number]  Date: 2017    TYPE OF TEST: Cerebrovascular Duplex    REASON FOR TEST    Right Carotid:-             Proximal               Mid                 Distal  cm/s  Systolic  Diastolic  Systolic  Diastolic  Systolic  Diastolic  CCA:     64.5      13.9       71.2      12.8       59.1      13.0  Bulb:  ECA:     50.1       6.6  ICA:     42.0       8.3       58.4      17.8       78.2      16.5  ICA/CCA:  0.8       1.2    ICA Stenosis: Normal    Right Vertebral:-  Finding: Antegrade  Sys:       37.3  Leslie:       10.2    Right Subclavian: Normal    Left Carotid:-            Proximal                Mid                 Distal  cm/s  Systolic  Diastolic  Systolic  Diastolic  Systolic  Diastolic  CCA:    233.5      15.8       77.6      18.0       63.5      15.9  Bulb:  ECA:     70.8      11.4  ICA:     47.0      13.9       44.0     712.8       53.6      15.8  ICA/CCA:  0.5       1.0    ICA Stenosis: Normal    Left Vertebral:-  Finding: Antegrade  Sys:       39.0  Leslie:       10.5    Left Subclavian: Normal    INTERPRETATION/FINDINGS  Duplex images were obtained using 2-D gray scale, color flow, and  spectral Doppler analysis. 1. No evidence of significant arterial occlusive disease in the  internal carotid arteries. 2. No significant stenosis in the external carotid arteries  bilaterally. 3. Antegrade flow in both vertebral arteries. 4. Normal flow in both subclavian arteries. ADDITIONAL COMMENTS    I have personally reviewed the data relevant to the interpretation of  this  study.     TECHNOLOGIST: Idania Santoyo RVT  Signed: 2017 11:05 AM    PHYSICIAN: Elisabeth Landau MD  Signed: 2017 01:26 PM

## 2017-07-05 NOTE — DISCHARGE SUMMARY
[address], [address]       DISCHARGE SUMMARY    Name: Tahmina Roque MRN: [id number]   Age / Sex: 78 y.o. / female CSN: [id number]   YOB: 1950 Length of Stay: 2 days   Admit Date: 7/3/2017 Discharge Date:        PRIMARY CARE PHYSICIAN: Louis York MD      DISCHARGE DIAGNOSES:    1- Likely hypertensive encephalopathy  2- Uncontrolled HTN  3- DM2  4- Dyslipidemia  5- QT prolongation    COURSE IN THE HOSPITAL: Patient presented with change in Mental status, was admitted to neuro floor. MRI brain showed NO stroke. Initially had very high BP, improved with medications. Neurology recommended to discontinue tri cyclic antidepressants in the setting of uncontrolled HTN and QT prolongation. Mentation improved. QT prolongation remained stable. PT, OT saw patient. Neurology signed off    ECHO summary pasted below  SUMMARY:  Left ventricle: Systolic function was normal by visual assessment. Ejection fraction was estimated in the range of 55 % to 60 %. No obvious  wall motion abnormalities identified in the views obtained. Wall thickness  was mildly increased. Atrial septum: Contrast injection was performed. There was no  right-to-left shunt, with provocative maneuvers to increase right atrial  pressure. Mitral valve: There was trivial regurgitation. Aortic valve: There was trivial regurgitation. CAROTID DOPPLER FINDINGS pasted below  INTERPRETATION/FINDINGS  Duplex images were obtained using 2-D gray scale, color flow, and  spectral Doppler analysis. 1. No evidence of significant arterial occlusive disease in the  internal carotid arteries. 2. No significant stenosis in the external carotid arteries  bilaterally. 3. Antegrade flow in both vertebral arteries. 4. Normal flow in both subclavian arteries    MRI IMPRESSION PASTED BELOW  IMPRESSION:     1. Negative for acute infarct. No acute hemorrhage or mass. 2. Mild cerebral volume loss.  Mild chronic microvascular ischemic changes in the  cerebral white matter. 3. There is a small left mastoid effusion      ACUTE MEDICAL ISSUES ADDRESSED as INPATIENT :     1- Likely hypertensive encephalopathy  2- Uncontrolled HTN  3- DM2  4- Dyslipidemia  5- QT prolongation     PLAN   plavix and statin,   On norvasc, lisinopril, metoprolol,   Neurology saw patient and recommended to stop tricyclic antidepressants 2/2 very high BP and QT prolongation. BP improving, may need further titration of medications. MRI Brain- NO stroke noted. QT prolongation remained stable on EKG    MEDICATIONS ON DISCHARGE:    Current Discharge Medication List      START taking these medications    Details   amLODIPine (NORVASC) 10 mg tablet Take 1 Tab by mouth daily. Qty: 30 Tab, Refills: 0      metoprolol tartrate (LOPRESSOR) 25 mg tablet Take 0.5 Tabs by mouth two (2) times a day. Qty: 30 Tab, Refills: 0      OTHER Check CBC, CMP, Mg in 3 days, results to PCP immediately. Diagnosis- HTN  Qty: 1 Each, Refills: 0         CONTINUE these medications which have CHANGED    Details   famotidine (PEPCID) 20 mg tablet Take 1 Tab by mouth daily. Qty: 30 Tab, Refills: 0         CONTINUE these medications which have NOT CHANGED    Details   lisinopril (PRINIVIL, ZESTRIL) 10 mg tablet Take 10 mg by mouth daily. montelukast (SINGULAIR) 10 mg tablet Take 10 mg by mouth daily. iron polysaccharides (NIFEREX) 150 mg iron capsule Take 150 mg by mouth daily. clopidogrel (PLAVIX) 75 mg tab Take 75 mg by mouth. atorvastatin (LIPITOR) 40 mg tablet Take 40 mg by mouth daily. glimepiride (AMARYL) 2 mg tablet Take 2 mg by mouth every morning. metFORMIN (GLUCOPHAGE) 1,000 mg tablet Take 1,000 mg by mouth two (2) times daily (with meals). fluticasone (VERAMYST) 27.5 mcg/actuation nasal spray 2 Sprays by Nasal route daily. fluticasone-vilanterol (BREO ELLIPTA) 200-25 mcg/dose inhaler Take 1 Puff by inhalation daily. STOP taking these medications       naproxen (NAPROSYN) 375 mg tablet Comments:   Reason for Stopping:         busPIRone (BUSPAR) 15 mg tablet Comments:   Reason for Stopping:                 DISCHARGE VITAL SIGNS:  Visit Vitals    /88 (BP 1 Location: Right arm, BP Patient Position: Supine)    Pulse 72    Temp 98.4 °F (36.9 °C)    Resp 17    Ht 5' 6\" (1.676 m)    Wt 89.1 kg (196 lb 8 oz)    SpO2 97%    Breastfeeding No    BMI 31.72 kg/m2       DISCHARGE PHYSICAL EXAMINATION:  General:  Awake, alert  Cardiovascular:  S1S2+, RRR  Pulmonary:  CTA b/l  GI:  Soft, BS+, NT, ND  Extremities:  No edema      CONDITION ON DISCHARGE: Stable. DISPOSITION: Patient clinically improved and was discharged home with   home health care  physical therapy, occupational therapy, skilled nursing . FOLLOW-UP RECOMMENDATIONS:   Follow-up Information     Follow up With Details Comments Contact Info    Shira King on 7/10/2017 follow up @4:00pm [address] [address]  [phone]          OTHER INSTRUCTIONS:  1. Diet. - cardiac , diabetic  2. Activity. As tolerated. 3. Safety / fall precautions. 4. Aspiration precautions. TIME SPENT ON DISCHARGE ACTIVITIES: More than 35 minutes.       Signed:  Yenifer Sahu MD      7/5/2017

## 2017-07-05 NOTE — PROGRESS NOTES
Care Management Interventions  Physical Therapy Consult: Yes  Occupational Therapy Consult: Yes  Current Support Network: Lives with Spouse  Confirm Follow Up Transport: Family  Plan discussed with Pt/Family/Caregiver: Yes  Discharge Location  Discharge Placement: Home     Pt is a 78year old female admitted for TIA. Pt is alert and oriented and alone in room. Pt states that she resides with her spouse Jordan Villela ([phone] or [phone]) and her plan is to be able to return home at discharge. Pt indicates being independent with ADLs, IADLs and ambulation prior to admission. Pt's spouse will be assisting her with transportation at discharge.

## 2017-07-05 NOTE — PROGRESS NOTES
Home Care Face to Face Encounter    Patients Name: Willa Gilliland    YOB: 1950    Primary Diagnosis: Hypertensive Encephalopathy    Date of Face to Face:   07/05/17                                Face to Face Encounter findings are related to primary reason for home care:   yes. 1. I certify that the patient needs intermittent care as follows: skilled nursing care:  skilled observation/assessment, patient education  physical therapy: strengthening, stretching/ROM, transfer training, gait/stair training, balance training and pt/caregiver education  occupational therapy:  ADL safety (ie. cooking, bathing, dressing), ROM and pt/caregiver education    2. I certify that this patient is homebound, that is: 1) patient requires the use of a SPC device, special transportation, or assistance of another to leave the home; or 2) patient's condition makes leaving the home medically contraindicated; and 3) patient has a normal inability to leave the home and leaving the home requires considerable and taxing effort. Patient may leave the home for infrequent and short duration for medical reasons, and occasional absences for non-medical reasons. Homebound status is due to the following functional limitations: Patient with strength deficits limiting the performance of all ADL's without caregiver assistance or the use of an assistive device. Patient with poor safety awareness and is at risk for falls without assistance of another person and the use of an assistive device. Patient with poor ambulation endurance limiting their safe ability to ascend/descend the required number of steps to leave the home. 3. I certify that this patient is under my care and that I, or a nurse practitioner or [facility], or clinical nurse specialist, or certified nurse midwife, working with me, had a Face-to-Face Encounter that meets the physician Face-to-Face Encounter requirements.   The following are the clinical findings from the Face-to-Face encounter that support the need for skilled services .   HTN  DM2  Dyslipidemia      Adriana Schuster MD  7/5/2017

## 2020-07-21 ENCOUNTER — OFFICE VISIT (OUTPATIENT)
Dept: ORTHOPEDIC SURGERY | Age: 70
End: 2020-07-21
Payer: MEDICARE

## 2020-07-21 VITALS — HEIGHT: 67 IN | WEIGHT: 208 LBS | BODY MASS INDEX: 32.65 KG/M2

## 2020-07-21 DIAGNOSIS — M17.12 PRIMARY OSTEOARTHRITIS OF LEFT KNEE: ICD-10-CM

## 2020-07-21 DIAGNOSIS — M17.11 PRIMARY OSTEOARTHRITIS OF RIGHT KNEE: Primary | ICD-10-CM

## 2020-07-21 PROCEDURE — 20611 DRAIN/INJ JOINT/BURSA W/US: CPT | Performed by: ORTHOPAEDIC SURGERY

## 2020-07-21 RX ORDER — BLOOD-GLUCOSE METER
EACH MISCELLANEOUS
COMMUNITY
Start: 2018-02-28

## 2020-07-21 RX ORDER — ISOSORBIDE MONONITRATE 30 MG/1
TABLET, EXTENDED RELEASE ORAL
COMMUNITY

## 2020-07-21 RX ORDER — DIAZEPAM 5 MG/1
5 TABLET ORAL
COMMUNITY
Start: 2015-02-19

## 2020-07-21 RX ORDER — FUROSEMIDE 20 MG/1
TABLET ORAL
COMMUNITY

## 2020-07-21 RX ORDER — TRAMADOL HYDROCHLORIDE 50 MG/1
TABLET ORAL
COMMUNITY
Start: 2020-02-25

## 2020-07-21 RX ORDER — ALBUTEROL SULFATE 90 UG/1
AEROSOL, METERED RESPIRATORY (INHALATION)
COMMUNITY

## 2020-07-21 RX ORDER — PEN NEEDLE, DIABETIC 29 G X1/2"
NEEDLE, DISPOSABLE MISCELLANEOUS
COMMUNITY

## 2020-07-21 RX ORDER — CETIRIZINE HCL 10 MG
10 TABLET ORAL DAILY
COMMUNITY

## 2020-07-21 RX ORDER — BLOOD-GLUCOSE METER
EACH MISCELLANEOUS
COMMUNITY

## 2020-07-21 RX ORDER — ESOMEPRAZOLE MAGNESIUM 40 MG/1
40 CAPSULE, DELAYED RELEASE ORAL
COMMUNITY
Start: 2014-01-09

## 2020-07-21 RX ORDER — AMITRIPTYLINE HYDROCHLORIDE 10 MG/1
TABLET, FILM COATED ORAL
COMMUNITY
Start: 2014-06-02

## 2020-07-21 RX ORDER — DICLOFENAC SODIUM 10 MG/G
GEL TOPICAL
COMMUNITY

## 2020-07-21 RX ORDER — INSULIN PUMP SYRINGE, 3 ML
EACH MISCELLANEOUS
COMMUNITY

## 2020-07-21 RX ORDER — AMLODIPINE BESYLATE 5 MG/1
TABLET ORAL
COMMUNITY

## 2020-07-21 RX ORDER — HYDROCODONE BITARTRATE AND ACETAMINOPHEN 5; 325 MG/1; MG/1
TABLET ORAL
COMMUNITY

## 2020-07-21 RX ORDER — INSULIN ASPART 100 [IU]/ML
INJECTION, SUSPENSION SUBCUTANEOUS
COMMUNITY

## 2020-07-21 RX ORDER — NEOMYCIN SULFATE, POLYMYXIN B SULFATE AND HYDROCORTISONE 10; 3.5; 1 MG/ML; MG/ML; [USP'U]/ML
SUSPENSION/ DROPS AURICULAR (OTIC)
COMMUNITY

## 2020-07-21 RX ORDER — OMEGA-3 FATTY ACIDS 1000 MG
1 CAPSULE ORAL DAILY
COMMUNITY

## 2020-07-21 RX ORDER — GATIFLOXACIN 5 MG/ML
SOLUTION/ DROPS OPHTHALMIC
COMMUNITY
Start: 2020-07-06

## 2020-07-21 RX ORDER — LANCETS 33 GAUGE
EACH MISCELLANEOUS
COMMUNITY

## 2020-07-21 RX ORDER — KETOROLAC TROMETHAMINE 5 MG/ML
SOLUTION OPHTHALMIC
COMMUNITY
Start: 2020-07-06

## 2020-07-21 RX ORDER — NITROGLYCERIN 0.4 MG/1
TABLET SUBLINGUAL
COMMUNITY

## 2020-07-21 RX ORDER — DUREZOL 0.5 MG/ML
EMULSION OPHTHALMIC
COMMUNITY
Start: 2020-07-06

## 2020-07-21 RX ORDER — BUSPIRONE HYDROCHLORIDE 15 MG/1
TABLET ORAL
COMMUNITY

## 2020-07-21 RX ORDER — ASPIRIN AND DIPYRIDAMOLE 25; 200 MG/1; MG/1
1 CAPSULE, EXTENDED RELEASE ORAL
COMMUNITY
Start: 2020-05-07

## 2020-07-21 RX ORDER — PHENAZOPYRIDINE HYDROCHLORIDE 95 MG/1
2 TABLET ORAL DAILY
COMMUNITY

## 2020-07-21 RX ORDER — CALCIUM CITRATE/VITAMIN D3 200MG-6.25
TABLET ORAL
COMMUNITY

## 2020-07-21 RX ORDER — ASPIRIN 81 MG/1
TABLET ORAL
COMMUNITY

## 2020-07-21 RX ORDER — LORATADINE 10 MG/1
TABLET ORAL
COMMUNITY

## 2020-07-21 RX ORDER — BLOOD SUGAR DIAGNOSTIC
STRIP MISCELLANEOUS
COMMUNITY

## 2020-07-21 RX ORDER — PEN NEEDLE, DIABETIC 30 GX3/16"
NEEDLE, DISPOSABLE MISCELLANEOUS
COMMUNITY

## 2020-07-21 NOTE — PROGRESS NOTES
1. Have you been to the ER, urgent care clinic since your last visit? Hospitalized since your last visit? No    2. Have you seen or consulted any other health care providers outside of the [address] [place] [address] since your last visit? Include any pap smears or colon screening.  No Not applicable as gestational age is greater than or equal to 34 weeks.

## 2020-07-21 NOTE — PROGRESS NOTES
Name: Vaishali Leiva. Date: [unfilled]   : 1950  Service Dept: [address] [place] [address] AND [facility]     Provider: [unfilled]   Insurance: [unfilled]     Chief Complaint   Patient presents with    Knee Pain      [address]    Patient's Pharmacies:  No Pharmacies Listed     Visit Vitals  Ht 5' 7\" (1.702 m)   Wt 208 lb (94.3 kg)   BMI 32.58 kg/m²        No Known Allergies     Current Outpatient Medications   Medication Sig Dispense Refill    Blood-Glucose Meter (Contour Next One Meter) misc Contour Next One Meter   Contour Next One , 1 (one) strips strips check sugars 2-3 times daily for uncontrolled diabetes # 300, 2018, Ref. x5. Active      amitriptyline (ELAVIL) 10 mg tablet amitriptyline 10 mg tablet   Take 1 tablet every day by oral route at bedtime.  aspirin-dipyridamole (AGGRENOX)  mg per SR capsule Take 1 Cap by mouth.  diazePAM (VALIUM) 5 mg tablet Take 5 mg by mouth.  esomeprazole (NEXIUM) 40 mg capsule Take 40 mg by mouth.  iron polysacch complex-b12-fa (NIFEREX FORTE) capsule Take 1 Cap by mouth two (2) times a day.  traMADoL (ULTRAM) 50 mg tablet tramadol 50 mg tablet   Take 1 tablet 3 times a day by oral route.       Blood-Glucose Meter (Accu-Chek Yokasta Plus Meter) misc Accu-Chek Yokasta Plus Meter      glucose blood VI test strips (Accu-Chek Yokasta Plus test strp) strip Accu-Chek Yokasta Plus test strips      insulin needles, disposable, (BD Ultra-Fine Orig Pen Needle) 29 gauge x 1/2\" ndle BD Ultra-Fine Original Pen Needle 29 gauge x 1/2\"      diclofenac (VOLTAREN) 1 % gel diclofenac 1 % topical gel      Insulin Needles, Disposable, (Droplet Pen Needle) 31 gauge x 5/16\" ndle Droplet Pen Needle 31 gauge x 5/16\"      Blood-Glucose Meter (True Metrix Air Glucose Meter) monitoring kit True Metrix Air Glucose Meter kit      glucose blood VI test strips (True Metrix Glucose Test Strip) strip True Metrix Glucose Test Strip     [facility] lancets (TRUEplus Lancets) 33 gauge misc TRUEplus Lancets 33 gauge      albuterol (ProAir HFA) 90 mcg/actuation inhaler ProAir HFA 90 mcg/actuation aerosol inhaler   Inhale 2 puffs every 4-6 hours by inhalation route.  aspirin delayed-release 81 mg tablet aspirin 81 mg tablet,delayed release   TAKE 1 TABLET BY MOUTH ONCE DAILY      busPIRone (BUSPAR) 15 mg tablet buspirone 15 mg tablet      cetirizine (ZYRTEC) 10 mg tablet Take 10 mg by mouth daily.  DurezoL 0.05 % ophthalmic emulsion INSTILL 1 DROP INTO OPERATIVE EYE TWICE DAILY FOR 14 DAYS, THEN ONCE DAILY FOR 14 DAYS      furosemide (LASIX) 20 mg tablet furosemide 20 mg tablet   Take 1 tablet every day by oral route as needed.  garlic 1 mg cap Take 1 Cap by mouth daily.  gatifloxacin (ZYMAXID) 0.5 % drop ophthalmic solution INSTILL ONE DROP IN OPERATIVE EYE FOUR TIMES DAILY. STARTING AFTER SURGERY FOR 1 WEEK THEN STOP      HYDROcodone-acetaminophen (NORCO) 5-325 mg per tablet hydrocodone 5 mg-acetaminophen 325 mg tablet      insulin aspart protamine/insulin aspart (NovoLOG Mix 70-30FlexPen U-100) 100 unit/mL (70-30) inpn Novolog Mix 70-30 FlexPen U-100 Insulin 100 unit/mL subcutaneous pen   INJECT 32 UNITS SUBCUTANEOUSLY BEFORE BREAKFAST, 10 before lunch AND 24 UNITS BEFORE SUPPER      isosorbide mononitrate ER (IMDUR) 30 mg tablet isosorbide mononitrate ER 30 mg tablet,extended release 24 hr      ketorolac (ACULAR) 0.5 % ophthalmic solution INSTILL 1 DROP INTO OPERATIVE EYE FOUR TIMES DAILY. START 3 DAYS PRIOR TO SURGERY      loratadine (CLARITIN) 10 mg tablet loratadine 10 mg tablet   Take 1 tablet every day by oral route as needed.  neomycin-polymyxin-hydrocortisone, buffered, (PEDIOTIC) 3.5-10,000-1 mg/mL-unit/mL-% otic suspension neomycin-polymyxin-hydrocort 3.5 mg-10,000 unit/mL-1 % ear drops,susp      nitroglycerin (NITROSTAT) 0.4 mg SL tablet nitroglycerin 0.4 mg sublingual tablet   Place 1 tablet by sublingual route.  omega-3 fatty acids 1,000 mg cap Take 1 Cap by mouth daily.  phenazopyridine (PYRIDIUM) 95 mg tab Take 2 Tabs by mouth daily.  amLODIPine (NORVASC) 5 mg tablet amlodipine 5 mg tablet      famotidine (PEPCID) 20 mg tablet Take 1 Tab by mouth daily. 30 Tab 0    amLODIPine (NORVASC) 10 mg tablet Take 1 Tab by mouth daily. 30 Tab 0    metoprolol tartrate (LOPRESSOR) 25 mg tablet Take 0.5 Tabs by mouth two (2) times a day. 30 Tab 0    OTHER Check CBC, CMP, Mg in 3 days, results to PCP immediately. Diagnosis- HTN 1 Each 0    lisinopril (PRINIVIL, ZESTRIL) 10 mg tablet Take 10 mg by mouth daily.  montelukast (SINGULAIR) 10 mg tablet Take 10 mg by mouth daily.  iron polysaccharides (NIFEREX) 150 mg iron capsule Take 150 mg by mouth daily.  clopidogrel (PLAVIX) 75 mg tab Take 75 mg by mouth.  atorvastatin (LIPITOR) 40 mg tablet Take 40 mg by mouth daily.  glimepiride (AMARYL) 2 mg tablet Take 2 mg by mouth every morning.  metFORMIN (GLUCOPHAGE) 1,000 mg tablet Take 1,000 mg by mouth two (2) times daily (with meals).  fluticasone (VERAMYST) 27.5 mcg/actuation nasal spray 2 Sprays by Nasal route daily.  fluticasone-vilanterol (BREO ELLIPTA) 200-25 mcg/dose inhaler Take 1 Puff by inhalation daily. Patient Active Problem List   Diagnosis Code    TIA (transient ischemic attack) G45.9        No family history on file. Social History     Socioeconomic History    Marital status: UNKNOWN     Spouse name: Not on file    Number of children: Not on file    Years of education: Not on file    Highest education level: Not on file   Tobacco Use    Smoking status: Never Smoker    Smokeless tobacco: Never Used   Substance and Sexual Activity    Alcohol use: No    Drug use: No        No past surgical history on file.      Past Medical History:   Diagnosis Date    Diabetes ([address]     Hypertension     Stroke [facility])         HPI:   I have reviewed and agree with [facility] [place] BRIAN and ROS and intake form in chart and the record. Review of Systems:   Patient is a pleasant appearing individual, appropriately dressed, well hydrated, well nourished, who is alert, appropriately oriented for age, and in no acute distress with a normal gait and normal affect who does not appear to be in any significant pain. Physical Exam:  Left Knee -Decrease range of motion with flexion, Knee arc of greater than 50 degrees, Some crepitation, Grossly neurovascularly intact, Good cap refill, No skin lesion, Moderate swelling, No gross instability, Some quadriceps weakness Kellgren and Rafael at least grade 3    Right Knee -Decrease range of motion with flexion, Some crepitation, Grossly neurovascularly intact, Good cap refill, No skin lesion, Moderate swelling, No gross instability, Some quadriceps weaknessKellgren and Rafael at least grade 3     Procedure Documentation:  Please note that Outbrain ultrasound was used to perform an ultrasound guided injection into the bilateral knees. A pre-injection ultrasound was taken for bilateral knees. After the needle was placed into the anterolateral portal(s) and while the Orthovisc was injected another ultrasound picture confirmed the appropriate placement in bilateral knees. The site of injection, bilateral knees was sterilely prepped. The injection of Orthovisc was administered appropriately in bilateral knees and the patient tolerated it well. No site reaction was identified. Appropriate dressing was placed. Consent was obtained for the injection. HPI:  The patient is here with a chief complaint of bilateral knee pain. Assessment/Plan: We are going to go ahead and inject bilateral knees with 4th Orthovisc today and we will see the patient back on a PRN basis and go from there. Return to Office:    Follow-up Information    None

## 2020-07-22 ENCOUNTER — DOCUMENTATION ONLY (OUTPATIENT)
Dept: ORTHOPEDIC SURGERY | Age: 70
End: 2020-07-22

## 2021-08-24 ENCOUNTER — OFFICE VISIT (OUTPATIENT)
Dept: ORTHOPEDIC SURGERY | Age: 71
End: 2021-08-24

## 2021-08-24 VITALS — BODY MASS INDEX: 31.23 KG/M2 | WEIGHT: 199 LBS | HEIGHT: 67 IN

## 2021-08-24 DIAGNOSIS — M25.561 RIGHT KNEE PAIN, UNSPECIFIED CHRONICITY: ICD-10-CM

## 2021-08-24 DIAGNOSIS — M17.11 OSTEOARTHRITIS OF RIGHT KNEE, UNSPECIFIED OSTEOARTHRITIS TYPE: ICD-10-CM

## 2021-08-24 DIAGNOSIS — M17.11 OSTEOARTHRITIS OF RIGHT KNEE, UNSPECIFIED OSTEOARTHRITIS TYPE: Primary | ICD-10-CM

## 2021-08-24 PROCEDURE — G8432 DEP SCR NOT DOC, RNG: HCPCS | Performed by: ORTHOPAEDIC SURGERY

## 2021-08-24 PROCEDURE — G8536 NO DOC ELDER MAL SCRN: HCPCS | Performed by: ORTHOPAEDIC SURGERY

## 2021-08-24 PROCEDURE — G8427 DOCREV CUR MEDS BY ELIG CLIN: HCPCS | Performed by: ORTHOPAEDIC SURGERY

## 2021-08-24 PROCEDURE — 99214 OFFICE O/P EST MOD 30 MIN: CPT | Performed by: ORTHOPAEDIC SURGERY

## 2021-08-24 PROCEDURE — 1090F PRES/ABSN URINE INCON ASSESS: CPT | Performed by: ORTHOPAEDIC SURGERY

## 2021-08-24 PROCEDURE — 1101F PT FALLS ASSESS-DOCD LE1/YR: CPT | Performed by: ORTHOPAEDIC SURGERY

## 2021-08-24 PROCEDURE — 3017F COLORECTAL CA SCREEN DOC REV: CPT | Performed by: ORTHOPAEDIC SURGERY

## 2021-08-24 PROCEDURE — G8419 CALC BMI OUT NRM PARAM NOF/U: HCPCS | Performed by: ORTHOPAEDIC SURGERY

## 2021-08-24 PROCEDURE — G8400 PT W/DXA NO RESULTS DOC: HCPCS | Performed by: ORTHOPAEDIC SURGERY

## 2021-08-24 NOTE — PATIENT INSTRUCTIONS
Knee Pain or Injury: Care Instructions  Your Care Instructions     Injuries are a common cause of knee problems. Sudden (acute) injuries may be caused by a direct blow to the knee. They can also be caused by abnormal twisting, bending, or falling on the knee. Pain, bruising, or swelling may be severe, and may start within minutes of the injury. Overuse is another cause of knee pain. Other causes are climbing stairs, kneeling, and other activities that use the knee. Everyday wear and tear, especially as you get older, also can cause knee pain. Rest, along with home treatment, often relieves pain and allows your knee to heal. If you have a serious knee injury, you may need tests and treatment. Follow-up care is a key part of your treatment and safety. Be sure to make and go to all appointments, and call your doctor if you are having problems. It's also a good idea to know your test results and keep a list of the medicines you take. How can you care for yourself at home? · Be safe with medicines. Read and follow all instructions on the label. ? If the doctor gave you a prescription medicine for pain, take it as prescribed. ? If you are not taking a prescription pain medicine, ask your doctor if you can take an over-the-counter medicine. · Rest and protect your knee. Take a break from any activity that may cause pain. · Put ice or a cold pack on your knee for 10 to 20 minutes at a time. Put a thin cloth between the ice and your skin. · Prop up a sore knee on a pillow when you ice it or anytime you sit or lie down for the next 3 days. Try to keep it above the level of your heart. This will help reduce swelling. · If your knee is not swollen, you can put moist heat, a heating pad, or a warm cloth on your knee. · If your doctor recommends an elastic bandage, sleeve, or other type of support for your knee, wear it as directed.   · Follow your doctor's instructions about how much weight you can put on your leg. Use a cane, crutches, or a walker as instructed. · Follow your doctor's instructions about activity during your healing process. If you can do mild exercise, slowly increase your activity. · Reach and stay at a healthy weight. Extra weight can strain the joints, especially the knees and hips, and make the pain worse. Losing even a few pounds may help. When should you call for help? Call 911 anytime you think you may need emergency care. For example, call if:    · You have symptoms of a blood clot in your lung (called a pulmonary embolism). These may include:  ? Sudden chest pain. ? Trouble breathing. ? Coughing up blood. Call your doctor now or seek immediate medical care if:    · You have severe or increasing pain.     · Your leg or foot turns cold or changes color.     · You cannot stand or put weight on your knee.     · Your knee looks twisted or bent out of shape.     · You cannot move your knee.     · You have signs of infection, such as:  ? Increased pain, swelling, warmth, or redness. ? Red streaks leading from the knee. ? Pus draining from a place on your knee. ? A fever.     · You have signs of a blood clot in your leg (called a deep vein thrombosis), such as:  ? Pain in your calf, back of the knee, thigh, or groin. ? Redness and swelling in your leg or groin. Watch closely for changes in your health, and be sure to contact your doctor if:    · You have tingling, weakness, or numbness in your knee.     · You have any new symptoms, such as swelling.     · You have bruises from a knee injury that last longer than 2 weeks.     · You do not get better as expected. Where can you learn more? Go to http://www.gray.com/  Enter K195 in the search box to learn more about \"Knee Pain or Injury: Care Instructions. \"  Current as of: February 26, 2020               Content Version: 12.8  © 8339-2802 [employer].    Care instructions adapted under license by [employer] (which disclaims liability or warranty for this information). If you have questions about a medical condition or this instruction, always ask your healthcare professional. [address] any warranty or liability for your use of this information.

## 2021-08-24 NOTE — LETTER
Gait: Norm  Limp  Cane  Crutch   Walker   Chair   Simin Jang  :1950     Today's Date:2021       ELX:[id number]                                                                    Neck/Back/HIP    Est  2  3  4 New  2  3   Consult  3    Pre-op         Post-op     No LOS    Injection Utrasound  Injection NO Ultrasound   99958 L   09331        Medication    Cortisone                                                                                                       NECK/BACK    Cervicaigla                     M54.2   Low back pain             M54.5  Spinal Stenosis,cervical     M48.02         Spinal Stenosis Lumbar    M48.07    L Sciatica                           M54.42  R Sciatica                  M54.4                                                                           HIP     L Hip Pain      M25.552   R Hip Pain  M25.551    L Hip Bursitis         M70.62   R Bursitis                M70.61    L Hip OA      M16.12   R Hip OA  M16.11                                                      HIP FRACTURE    Femur Neck 24641    L Femur Neck S72.045A  R Femur Neck             S72.044A    Greater Troch  23625  L Trochanter   S72.115A  R Greater Trochanter  S72.114A     Pelvic Ring      60935  L Pubis  S32.512A  R Pubis                        S32.511A

## 2021-08-24 NOTE — Clinical Note
8/25/2021    Patient: Lo Morrow   YOB: 1950   Date of Visit: 8/24/2021     Marcella Chairez MD  [facility]  [phone]  Via     Dear Marcella Chairez MD,      Thank you for referring Ms. Diana Byers to [address] [place] [address] [facility] for evaluation. My notes for this consultation are attached. If you have questions, please do not hesitate to call me. I look forward to following your patient along with you.       Sincerely,    Tami Freitas MD

## 2021-08-24 NOTE — PROGRESS NOTES
Name: Pamela Case    : 1950     Service Dept: [address] [place] [address]    Patient's Pharmacies:  No Pharmacies Listed     Chief Complaint   Patient presents with    Neck Pain    Leg Pain        Visit Vitals  Ht 5' 7\" (1.702 m)   Wt 199 lb (90.3 kg)   BMI 31.17 kg/m²      No Known Allergies   Current Outpatient Medications   Medication Sig Dispense Refill    Blood-Glucose Meter (Accu-Chek Yokasta Plus Meter) misc Accu-Chek Yokasta Plus Meter      glucose blood VI test strips (Accu-Chek Yokasta Plus test strp) strip Accu-Chek Yokasta Plus test strips      insulin needles, disposable, (BD Ultra-Fine Orig Pen Needle) 29 gauge x 1/2\" ndle BD Ultra-Fine Original Pen Needle 29 gauge x 1/2\"      Blood-Glucose Meter (Contour Next One Meter) misc Contour Next One Meter   Contour Next One , 1 (one) strips strips check sugars 2-3 times daily for uncontrolled diabetes # 300, 2018, Ref. x5. Active      diclofenac (VOLTAREN) 1 % gel diclofenac 1 % topical gel      Insulin Needles, Disposable, (Droplet Pen Needle) 31 gauge x 5/16\" ndle Droplet Pen Needle 31 gauge x 5/16\"      Blood-Glucose Meter (True Metrix Air Glucose Meter) monitoring kit True Metrix Air Glucose Meter kit      glucose blood VI test strips (True Metrix Glucose Test Strip) strip True Metrix Glucose Test Strip      lancets (TRUEplus Lancets) 33 gauge misc TRUEplus Lancets 33 gauge      albuterol (ProAir HFA) 90 mcg/actuation inhaler ProAir HFA 90 mcg/actuation aerosol inhaler   Inhale 2 puffs every 4-6 hours by inhalation route.  amitriptyline (ELAVIL) 10 mg tablet amitriptyline 10 mg tablet   Take 1 tablet every day by oral route at bedtime.  aspirin delayed-release 81 mg tablet aspirin 81 mg tablet,delayed release   TAKE 1 TABLET BY MOUTH ONCE DAILY      aspirin-dipyridamole (AGGRENOX)  mg per SR capsule Take 1 Cap by mouth.       busPIRone (BUSPAR) 15 mg tablet buspirone 15 mg tablet      cetirizine (ZYRTEC) 10 mg tablet Take 10 mg by mouth daily.  diazePAM (VALIUM) 5 mg tablet Take 5 mg by mouth.  DurezoL 0.05 % ophthalmic emulsion INSTILL 1 DROP INTO OPERATIVE EYE TWICE DAILY FOR 14 DAYS, THEN ONCE DAILY FOR 14 DAYS      esomeprazole (NEXIUM) 40 mg capsule Take 40 mg by mouth.  furosemide (LASIX) 20 mg tablet furosemide 20 mg tablet   Take 1 tablet every day by oral route as needed.  garlic 1 mg cap Take 1 Cap by mouth daily.  gatifloxacin (ZYMAXID) 0.5 % drop ophthalmic solution INSTILL ONE DROP IN OPERATIVE EYE FOUR TIMES DAILY. STARTING AFTER SURGERY FOR 1 WEEK THEN STOP      HYDROcodone-acetaminophen (NORCO) 5-325 mg per tablet hydrocodone 5 mg-acetaminophen 325 mg tablet      insulin aspart protamine/insulin aspart (NovoLOG Mix 70-30FlexPen U-100) 100 unit/mL (70-30) inpn Novolog Mix 70-30 FlexPen U-100 Insulin 100 unit/mL subcutaneous pen   INJECT 32 UNITS SUBCUTANEOUSLY BEFORE BREAKFAST, 10 before lunch AND 24 UNITS BEFORE SUPPER      iron polysacch complex-b12-fa (NIFEREX FORTE) capsule Take 1 Cap by mouth two (2) times a day.  isosorbide mononitrate ER (IMDUR) 30 mg tablet isosorbide mononitrate ER 30 mg tablet,extended release 24 hr      ketorolac (ACULAR) 0.5 % ophthalmic solution INSTILL 1 DROP INTO OPERATIVE EYE FOUR TIMES DAILY. START 3 DAYS PRIOR TO SURGERY      loratadine (CLARITIN) 10 mg tablet loratadine 10 mg tablet   Take 1 tablet every day by oral route as needed.  neomycin-polymyxin-hydrocortisone, buffered, (PEDIOTIC) 3.5-10,000-1 mg/mL-unit/mL-% otic suspension neomycin-polymyxin-hydrocort 3.5 mg-10,000 unit/mL-1 % ear drops,susp      nitroglycerin (NITROSTAT) 0.4 mg SL tablet nitroglycerin 0.4 mg sublingual tablet   Place 1 tablet by sublingual route.  omega-3 fatty acids 1,000 mg cap Take 1 Cap by mouth daily.  phenazopyridine (PYRIDIUM) 95 mg tab Take 2 Tabs by mouth daily.       traMADoL (ULTRAM) 50 mg tablet tramadol 50 mg tablet   Take 1 tablet 3 times a day by oral route.  amLODIPine (NORVASC) 5 mg tablet amlodipine 5 mg tablet      famotidine (PEPCID) 20 mg tablet Take 1 Tab by mouth daily. 30 Tab 0    amLODIPine (NORVASC) 10 mg tablet Take 1 Tab by mouth daily. 30 Tab 0    metoprolol tartrate (LOPRESSOR) 25 mg tablet Take 0.5 Tabs by mouth two (2) times a day. 30 Tab 0    OTHER Check CBC, CMP, Mg in 3 days, results to PCP immediately. Diagnosis- HTN 1 Each 0    lisinopril (PRINIVIL, ZESTRIL) 10 mg tablet Take 10 mg by mouth daily.  montelukast (SINGULAIR) 10 mg tablet Take 10 mg by mouth daily.  iron polysaccharides (NIFEREX) 150 mg iron capsule Take 150 mg by mouth daily.  clopidogrel (PLAVIX) 75 mg tab Take 75 mg by mouth.  atorvastatin (LIPITOR) 40 mg tablet Take 40 mg by mouth daily.  glimepiride (AMARYL) 2 mg tablet Take 2 mg by mouth every morning.  metFORMIN (GLUCOPHAGE) 1,000 mg tablet Take 1,000 mg by mouth two (2) times daily (with meals).  fluticasone (VERAMYST) 27.5 mcg/actuation nasal spray 2 Sprays by Nasal route daily.  fluticasone-vilanterol (BREO ELLIPTA) 200-25 mcg/dose inhaler Take 1 Puff by inhalation daily. Patient Active Problem List   Diagnosis Code    TIA (transient ischemic attack) G45.9      No family history on file.    Social History     Socioeconomic History    Marital status: UNKNOWN     Spouse name: Not on file    Number of children: Not on file    Years of education: Not on file    Highest education level: Not on file   Tobacco Use    Smoking status: Never Smoker    Smokeless tobacco: Never Used   Substance and Sexual Activity    Alcohol use: No    Drug use: No     Social Determinants of Health     Financial Resource Strain:     Difficulty of Paying Living Expenses:    Food Insecurity:     Worried About Running Out of Food in the Last Year:     [address] in the Last Year:    Transportation Needs:     Lack of Transportation (Medical):  Lack of Transportation (Non-Medical):    Physical Activity:     Days of Exercise per Week:     Minutes of Exercise per Session:    Stress:     Feeling of Stress :    Social Connections:     Frequency of Communication with Friends and Family:     Frequency of Social Gatherings with Friends and Family:     Attends Church Services:     Active Member of Clubs or Organizations:     Attends Club or Organization Meetings:     Marital Status:       No past surgical history on file. Past Medical History:   Diagnosis Date    Diabetes ([facility] [address]     Hypertension     Stroke [facility])         I have reviewed and agree with [address] [place] [address] Nw and ROS and intake form in chart and the record furthermore I have reviewed prior medical record(s) regarding this patients care during this appointment. Review of Systems:   Patient is a pleasant appearing individual, appropriately dressed, well hydrated, well nourished, who is alert, appropriately oriented for age, and in no acute distress with a normal gait and normal affect who does not appear to be in any significant pain. Physical Exam:  Right Knee -Decrease range of motion with flexion, Knee arc of greater than 50 degrees, Some crepitation, Grossly neurovascularly intact, Good cap refill, No skin lesion, Moderate swelling, No gross instability, Some quadriceps weakness, Kellgren and Rafael at least grade 3    Left Knee - Full Range of Motion, No crepitation, Grossly neurovascularly intact, Good cap refill, No skin lesion, No swelling, No gross instability, No quadriceps weakness   Encounter Diagnoses     ICD-10-CM ICD-9-CM   1. Osteoarthritis of right knee, unspecified osteoarthritis type  M17.11 715.96   2. Right knee pain, unspecified chronicity  M25.561 719.46       HPI:  The patient is here with a chief complaint of right knee pain, throbbing, burning pain, progressively getting worse. Pain is 7/10.     X-rays of the right knee are unremarkable except for severe OA, failed conservative treatment including cortisone injection. Assessment/Plan:  Plan is for right total knee replacement with general medical clearance, cardiac clearance, and go from there. As part of continued conservative pain management options the patient was advised to utilize Tylenol or OTC NSAIDS as long as it is not medically contraindicated. Return to Office: Follow-up and Dispositions    · Return for schedule for surgery. Scribed by Janey Fleischer, LPN as dictated by [facility] White DOM Ivan MD.  Documentation True and Accepted Montez Ivan MD

## 2022-03-19 PROBLEM — G45.9 TIA (TRANSIENT ISCHEMIC ATTACK): Status: ACTIVE | Noted: 2017-07-03

## 2022-10-04 NOTE — H&P
[facility]  ROUTINE H AND PS    Name:  Aletha Moore  MR#:  [id number]  :  1950  Account #:  [de-identified]  Date of Adm:  2017      CHIEF COMPLAINT: Weakness, left upper extremity. HISTORY OF PRESENT ILLNESS: The patient is a 14-year-old black  female who apparently this morning developed sudden onset of a left  facial droop associated with a left upper extremity weakness. It lasted  off and on for approximately 3 hours and then abated spontaneously. By the time she got to the emergency room, it had resolved. The event  occurred about 9 a.m. this morning and she arrived into the emergency  room at approximately 11 a.m. By that time, as mentioned, the  neurologic abnormality had disappeared. The patient had 2 strokes in  the past, 1 approximately a year and a half ago. The last approximately  4 months ago. She was not a very good historian, so I have no idea  how she was treated. She has had minimal to no residual since that  time. She has a history of diabetes for which she has been prescribed  an oral agent. She also has a history of hypertension for which she  takes an oral agent as well. MEDICATIONS OF USE: As outlined from a previous hospital include:  1. Ultram 100 mg q.6 hours. 2. Amaryl 1 mg daily. 3. Lasix 40 mg daily. 4. Metformin 1000 mg b.i.d.  5. Lisinopril 10 mg daily. 6. Lipitor 40 mg daily. 7. Norvasc 10 mg daily. 8. Elavil 10 mg at bedtime. 9. Plavix 75 mg daily. 10. Pepcid 40 mg daily. 11. Aspirin 325 mg daily. 12. Zantac 150 mg daily. 13. BuSpar 5 mg on a p.r.n. basis. 14. Breo 2 puffs daily. 15. Albuterol 2 puffs q.i.d. p.r.n. SYSTEM REVIEW  CONSTITUTIONAL: Her weight has been stable for the past several  months. She has had no fevers. HEENT: No problems with her vision, visual field cuts, tenderness,  difficulty with pain in her face, or postnasal drip.   RESPIRATORY: She has a history of cough, wheezing and sputum  production. CARDIOVASCULAR: No chest pain, orthopnea, paroxysmal nocturnal  dyspnea, or pedal edema. GI: No dysphagia, hematemesis, or melena. : No urologic abnormalities. ENDOCRINOLOGIC: She is a diabetic, as mentioned. HEMATOLOGIC: No coagulopathies or thromboembolic phenomena. ORTHOPEDIC: No bone pain. Pain or swollen joints or myopathies. SKIN: No history of easy bruisability or pruritus. NEUROLOGIC: As mentioned above. PAST MEDICAL HISTORY: As I mentioned above. SOCIAL HISTORY: She does not smoke, does not use alcohol. ALLERGIES: SHE HAS NO ALLERGIES TO EITHER SEASONAL OR  TO DRUGS. PHYSICAL EXAMINATION  GENERAL: When I saw her, she was a well-developed, well-  nourished, white female who by the time I saw her was in no distress. VITAL SIGNS: Blood pressure was 194/104, pulse was 93 and  irregular, respiratory rate was 16, and she was afebrile. HEENT: Unremarkable. Pharynx was clear. NECK: Supple. She had no carotid bruits. No enlargement of her  thyroid gland. No enlargement of the cervical nodes. Trachea was  midline. CHEST: Revealed good airway movement bilaterally. She had no  wheezing, rales, or rhonchi. CARDIAC: Reveals PMI to be at 5th intercostal space of midclavicular  line. S1 and S2 were normal. She had no S3 or S4 or murmur. ABDOMEN: Soft. She had no palpable organs or masses. EXTREMITIES: Reveal trace to +1 pedal edema bilaterally. She had  good pulses all around. NEUROLOGIC: When I saw her, revealed her to be awake and alert. Cranial nerves 1-12 by the time I saw her were within normal limits. Sensorimotor examination was normal bilaterally.  was normal and  strong. DTRs were hyporeflexic and bilaterally equal. Plantar  responses were down. IMPRESSION:  1. Transient ischemic attack. 2. Ischemic cerebrovascular disease. 3. Type 2 diabetes mellitus. 4. Hypertension, poorly controlled.     PLAN: The patient is to be admitted to the hospital. A CT of her head  was done which was noted. MRI of her head to be obtained. She was  started on PT and OT. Neurologic consultation can be obtained at the  request of the rounding physicians. With regard to her diabetes, we will  start bolus basal insulin therapy and with regard to her blood pressure,  we will resume her antihypertensives to include lisinopril, beta blocker  as well as Norvasc. Usual housekeeping procedures to include a PPI  and stress ulcer prophylaxis and heparin-induced compound for DVT  prophylaxis.         MD MARBELLA Cruz / EMILIANA  D:  07/03/2017   13:43  T:  07/03/2017   15:18  Job #:  [id number] [As Noted in HPI] : as noted in HPI [Negative] : Musculoskeletal

## 2025-07-04 ENCOUNTER — HOSPITAL ENCOUNTER (EMERGENCY)
Age: 75
Discharge: HOME | End: 2025-07-04
Attending: EMERGENCY MEDICINE
Payer: MEDICARE

## 2025-07-04 ENCOUNTER — APPOINTMENT (OUTPATIENT)
Age: 75
End: 2025-07-04
Payer: MEDICARE

## 2025-07-04 VITALS
TEMPERATURE: 98.2 F | WEIGHT: 179 LBS | BODY MASS INDEX: 28.77 KG/M2 | RESPIRATION RATE: 14 BRPM | HEART RATE: 74 BPM | SYSTOLIC BLOOD PRESSURE: 183 MMHG | DIASTOLIC BLOOD PRESSURE: 90 MMHG | HEIGHT: 66 IN | OXYGEN SATURATION: 98 %

## 2025-07-04 DIAGNOSIS — S90.862A INSECT BITE OF LEFT FOOT, INITIAL ENCOUNTER: Primary | ICD-10-CM
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
## 2025-07-04 PROCEDURE — 96372 THER/PROPH/DIAG INJ SC/IM: CPT

## 2025-07-04 PROCEDURE — 2500000003 HC RX 250 WO HCPCS: Performed by: EMERGENCY MEDICINE

## 2025-07-04 PROCEDURE — 6370000000 HC RX 637 (ALT 250 FOR IP): Performed by: EMERGENCY MEDICINE

## 2025-07-04 PROCEDURE — 99284 EMERGENCY DEPT VISIT MOD MDM: CPT

## 2025-07-04 PROCEDURE — 6360000002 HC RX W HCPCS: Performed by: EMERGENCY MEDICINE

## 2025-07-04 PROCEDURE — 73630 X-RAY EXAM OF FOOT: CPT

## 2025-07-04 RX ORDER — DOXYCYCLINE HYCLATE 100 MG
100 TABLET ORAL
Status: COMPLETED | OUTPATIENT
Start: 2025-07-04 | End: 2025-07-04

## 2025-07-04 RX ORDER — KETOROLAC TROMETHAMINE 30 MG/ML
30 INJECTION, SOLUTION INTRAMUSCULAR; INTRAVENOUS
Status: COMPLETED | OUTPATIENT
Start: 2025-07-04 | End: 2025-07-04

## 2025-07-04 RX ORDER — OXYCODONE AND ACETAMINOPHEN 5; 325 MG/1; MG/1
1 TABLET ORAL EVERY 6 HOURS PRN
Qty: 12 TABLET | Refills: 0 | Status: SHIPPED | OUTPATIENT
Start: 2025-07-04 | End: 2025-07-07

## 2025-07-04 RX ORDER — OXYCODONE AND ACETAMINOPHEN 5; 325 MG/1; MG/1
1 TABLET ORAL
Refills: 0 | Status: COMPLETED | OUTPATIENT
Start: 2025-07-04 | End: 2025-07-04

## 2025-07-04 RX ORDER — DOXYCYCLINE 100 MG/1
100 CAPSULE ORAL 2 TIMES DAILY
Qty: 20 CAPSULE | Refills: 0 | Status: SHIPPED | OUTPATIENT
Start: 2025-07-04 | End: 2025-07-14

## 2025-07-04 RX ORDER — PREDNISONE 10 MG/1
10 TABLET ORAL DAILY
Qty: 7 TABLET | Refills: 0 | Status: SHIPPED | OUTPATIENT
Start: 2025-07-04 | End: 2025-07-11

## 2025-07-04 RX ADMIN — DOXYCYCLINE HYCLATE 100 MG: 100 TABLET, COATED ORAL at 20:27

## 2025-07-04 RX ADMIN — WATER 60 MG: 1 INJECTION INTRAMUSCULAR; INTRAVENOUS; SUBCUTANEOUS at 19:12

## 2025-07-04 RX ADMIN — KETOROLAC TROMETHAMINE 30 MG: 30 INJECTION, SOLUTION INTRAMUSCULAR at 19:08

## 2025-07-04 RX ADMIN — OXYCODONE HYDROCHLORIDE AND ACETAMINOPHEN 1 TABLET: 5; 325 TABLET ORAL at 20:26

## 2025-07-04 ASSESSMENT — PAIN DESCRIPTION - ORIENTATION
ORIENTATION: LEFT

## 2025-07-04 ASSESSMENT — PAIN DESCRIPTION - LOCATION
LOCATION: FOOT

## 2025-07-04 ASSESSMENT — PAIN SCALES - GENERAL
PAINLEVEL_OUTOF10: 10
PAINLEVEL_OUTOF10: 5
PAINLEVEL_OUTOF10: 8
PAINLEVEL_OUTOF10: 10

## 2025-07-04 ASSESSMENT — LIFESTYLE VARIABLES
HOW MANY STANDARD DRINKS CONTAINING ALCOHOL DO YOU HAVE ON A TYPICAL DAY: PATIENT DOES NOT DRINK
HOW OFTEN DO YOU HAVE A DRINK CONTAINING ALCOHOL: NEVER

## 2025-07-04 ASSESSMENT — PAIN DESCRIPTION - DESCRIPTORS
DESCRIPTORS: ACHING;BURNING
DESCRIPTORS: ACHING

## 2025-07-04 ASSESSMENT — PAIN - FUNCTIONAL ASSESSMENT: PAIN_FUNCTIONAL_ASSESSMENT: 0-10

## 2025-07-04 ASSESSMENT — PAIN DESCRIPTION - PAIN TYPE: TYPE: ACUTE PAIN

## 2025-07-04 NOTE — ED TRIAGE NOTES
Pt reports she was bitten by something bit her left foot about a week ago, reports she is having swelling that has continued since the bit occurred.

## 2025-07-04 NOTE — ED PROVIDER NOTES
tablet Take 2 mg by mouthHistorical Med      HYDROcodone-acetaminophen (NORCO) 5-325 MG per tablet hydrocodone 5 mg-acetaminophen 325 mg tabletHistorical Med      insulin aspart protamine-insulin aspart (NOVOLOG 70/30) (70-30) 100 UNIT/ML injection Novolog Mix 70-30 FlexPen U-100 Insulin 100 unit/mL subcutaneous pen   INJECT 32 UNITS SUBCUTANEOUSLY BEFORE BREAKFAST, 10 before lunch AND 24 UNITS BEFORE SUPPERHistorical Med      iron polysaccharide complex-B12-folic acid (FERREX-FORTE) 150-0.025-1 MG CAPS capsule Take 1 capsule by mouth 2 times dailyHistorical Med      isosorbide mononitrate (IMDUR) 30 MG extended release tablet isosorbide mononitrate ER 30 mg tablet,extended release 24 hrHistorical Med      ketorolac (ACULAR) 0.5 % ophthalmic solution INSTILL 1 DROP INTO OPERATIVE EYE FOUR TIMES DAILY. START 3 DAYS PRIOR TO SURGERYHistorical Med      lisinopril (PRINIVIL;ZESTRIL) 10 MG tablet Take 10 mg by mouth dailyHistorical Med      loratadine (CLARITIN) 10 MG tablet loratadine 10 mg tablet   Take 1 tablet every day by oral route as needed.Historical Med      metFORMIN (GLUCOPHAGE) 1000 MG tablet Take 1,000 mg by mouth 2 times daily (with meals)Historical Med      metoprolol tartrate (LOPRESSOR) 25 MG tablet Take 12.5 mg by mouth 2 times dailyHistorical Med      montelukast (SINGULAIR) 10 MG tablet Take 10 mg by mouth dailyHistorical Med      neomycin-polymyxin-hydrocortisone (CORTISPORIN) 3.5-94554-4 otic suspension neomycin-polymyxin-hydrocort 3.5 mg-10,000 unit/mL-1 % ear drops,suspHistorical Med      nitroGLYCERIN (NITROSTAT) 0.4 MG SL tablet nitroglycerin 0.4 mg sublingual tablet   Place 1 tablet by sublingual route.Historical Med      phenazopyridine (PYRIDIUM) 95 MG tablet Take 2 tablets by mouth dailyHistorical Med      iron polysaccharides (NIFEREX) 150 MG capsule Take 150 mg by mouth dailyHistorical Med      traMADol (ULTRAM) 50 MG tablet tramadol 50 mg tablet   Take 1 tablet 3 times a day by oral        EMERGENCY DEPARTMENT COURSE and DIFFERENTIAL DIAGNOSIS/MDM   Vitals:    Vitals:    07/04/25 1920 07/04/25 1930 07/04/25 1945 07/04/25 2000   BP: (!) 159/82 (!) 178/90 (!) 166/110 (!) 183/90   Pulse: 82 78 67 74   Resp: 14 16 14 14   Temp:       TempSrc:       SpO2: 98% 94% 94% 98%   Weight:       Height:            Patient was given the following medications:  Medications   methylPREDNISolone sodium succ (SOLU-MEDROL) 60 mg in sterile water 0.96 mL injection (60 mg IntraMUSCular Given 7/4/25 1912)   ketorolac (TORADOL) injection 30 mg (30 mg IntraMUSCular Given 7/4/25 1908)   doxycycline hyclate (VIBRA-TABS) tablet 100 mg (100 mg Oral Given 7/4/25 2027)   oxyCODONE-acetaminophen (PERCOCET) 5-325 MG per tablet 1 tablet (1 tablet Oral Given 7/4/25 2026)       CONSULTS: (Who and What was discussed)  None    Chronic Conditions: As above    Social Determinants affecting Dx or Tx: None    Records Reviewed (source and summary): Old medical records.  Nursing notes.      CC/HPI Summary, DDx, ED Course, and Reassessment:     Patient reports was bitten by something on her left foot about a week ago.  She has continued having swelling, pain and itching left foot that seems spreading to her ankle.  On exam, trace swelling left foot, no obvious bite marks noted, foot is neurovascularly intact.  Patient treated for possible insect bite and is stable for discharge on small dose prednisone, doxycycline for possible secondary infection and small dose Percocet for pain.  She was given precautions for returning to ED.  She is to follow-up with PCP.    Disposition Considerations (Tests not done, Shared Decision Making, Pt Expectation of Test or Tx.):      FINAL IMPRESSION     1. Insect bite of left foot, initial encounter         DISPOSITION/PLAN   DISPOSITION Decision To Discharge 07/04/2025 08:23:30 PM               Discharged     PATIENT REFERRED TO:  Valentina Colbert MD  [address] [place] [address]